# Patient Record
Sex: MALE | Race: BLACK OR AFRICAN AMERICAN | NOT HISPANIC OR LATINO | Employment: STUDENT | URBAN - METROPOLITAN AREA
[De-identification: names, ages, dates, MRNs, and addresses within clinical notes are randomized per-mention and may not be internally consistent; named-entity substitution may affect disease eponyms.]

---

## 2018-10-22 ENCOUNTER — PATIENT MESSAGE (OUTPATIENT)
Dept: ADMINISTRATIVE | Facility: HOSPITAL | Age: 28
End: 2018-10-22

## 2018-11-05 ENCOUNTER — PATIENT MESSAGE (OUTPATIENT)
Dept: DIABETES | Facility: CLINIC | Age: 28
End: 2018-11-05

## 2019-11-19 ENCOUNTER — OFFICE VISIT (OUTPATIENT)
Dept: PULMONOLOGY | Facility: CLINIC | Age: 29
End: 2019-11-19
Payer: COMMERCIAL

## 2019-11-19 ENCOUNTER — HOSPITAL ENCOUNTER (OUTPATIENT)
Dept: RADIOLOGY | Facility: HOSPITAL | Age: 29
Discharge: HOME | End: 2019-11-19
Attending: INTERNAL MEDICINE
Payer: COMMERCIAL

## 2019-11-19 ENCOUNTER — PATIENT MESSAGE (OUTPATIENT)
Dept: PULMONOLOGY | Facility: CLINIC | Age: 29
End: 2019-11-19

## 2019-11-19 VITALS
BODY MASS INDEX: 19.61 KG/M2 | HEIGHT: 73 IN | OXYGEN SATURATION: 97 % | SYSTOLIC BLOOD PRESSURE: 124 MMHG | HEART RATE: 95 BPM | RESPIRATION RATE: 18 BRPM | WEIGHT: 147.94 LBS | DIASTOLIC BLOOD PRESSURE: 72 MMHG

## 2019-11-19 DIAGNOSIS — J45.909 ASTHMA, NOT WELL CONTROLLED, UNSPECIFIED ASTHMA SEVERITY, UNSPECIFIED WHETHER COMPLICATED, UNSPECIFIED WHETHER PERSISTENT: ICD-10-CM

## 2019-11-19 DIAGNOSIS — Z82.5 FAMILY HISTORY OF ASTHMA: ICD-10-CM

## 2019-11-19 DIAGNOSIS — J45.909 ASTHMA, NOT WELL CONTROLLED, UNSPECIFIED ASTHMA SEVERITY, UNSPECIFIED WHETHER COMPLICATED, UNSPECIFIED WHETHER PERSISTENT: Primary | ICD-10-CM

## 2019-11-19 DIAGNOSIS — Z87.09 HISTORY OF PNEUMOTHORAX: ICD-10-CM

## 2019-11-19 DIAGNOSIS — Z87.09 HISTORY OF INFLUENZA: ICD-10-CM

## 2019-11-19 PROCEDURE — 3008F PR BODY MASS INDEX (BMI) DOCUMENTED: ICD-10-PCS | Mod: CPTII,S$GLB,, | Performed by: INTERNAL MEDICINE

## 2019-11-19 PROCEDURE — 71046 X-RAY EXAM CHEST 2 VIEWS: CPT | Mod: TC

## 2019-11-19 PROCEDURE — 99205 PR OFFICE/OUTPT VISIT, NEW, LEVL V, 60-74 MIN: ICD-10-PCS | Mod: S$GLB,,, | Performed by: INTERNAL MEDICINE

## 2019-11-19 PROCEDURE — 99999 PR PBB SHADOW E&M-EST. PATIENT-LVL III: ICD-10-PCS | Mod: PBBFAC,,, | Performed by: INTERNAL MEDICINE

## 2019-11-19 PROCEDURE — 3008F BODY MASS INDEX DOCD: CPT | Mod: CPTII,S$GLB,, | Performed by: INTERNAL MEDICINE

## 2019-11-19 PROCEDURE — 99205 OFFICE O/P NEW HI 60 MIN: CPT | Mod: S$GLB,,, | Performed by: INTERNAL MEDICINE

## 2019-11-19 PROCEDURE — 71046 X-RAY EXAM CHEST 2 VIEWS: CPT | Mod: 26,,, | Performed by: RADIOLOGY

## 2019-11-19 PROCEDURE — 99999 PR PBB SHADOW E&M-EST. PATIENT-LVL III: CPT | Mod: PBBFAC,,, | Performed by: INTERNAL MEDICINE

## 2019-11-19 PROCEDURE — 71046 XR CHEST PA AND LATERAL: ICD-10-PCS | Mod: 26,,, | Performed by: RADIOLOGY

## 2019-11-19 RX ORDER — ALBUTEROL SULFATE 0.83 MG/ML
SOLUTION RESPIRATORY (INHALATION)
COMMUNITY
Start: 2019-11-08 | End: 2022-12-27 | Stop reason: SDUPTHER

## 2019-11-19 RX ORDER — PREDNISONE 10 MG/1
TABLET ORAL
Qty: 21 TABLET | Refills: 0 | Status: SHIPPED | OUTPATIENT
Start: 2019-11-19 | End: 2020-02-27

## 2019-11-19 RX ORDER — ALBUTEROL SULFATE 90 UG/1
2 AEROSOL, METERED RESPIRATORY (INHALATION) EVERY 6 HOURS PRN
Qty: 18 G | Refills: 11 | Status: SHIPPED | OUTPATIENT
Start: 2019-11-19 | End: 2021-08-21

## 2019-11-19 RX ORDER — FLUTICASONE FUROATE AND VILANTEROL 100; 25 UG/1; UG/1
1 POWDER RESPIRATORY (INHALATION) DAILY
Qty: 60 EACH | Refills: 6 | Status: SHIPPED | OUTPATIENT
Start: 2019-11-19 | End: 2021-06-22 | Stop reason: SDUPTHER

## 2019-11-19 NOTE — LETTER
November 19, 2019      No Recipients           [facility]  [address]  [place] [address]  Phone: [phone]  Fax: [phone]          Patient: Quinn Cardozo Jr.   MR Number: [id number]   YOB: 1990   Date of Visit: 11/19/2019       Dear :    Thank you for referring Quinn Cardozo to me for evaluation. Attached you will find relevant portions of my assessment and plan of care.    If you have questions, please do not hesitate to call me. I look forward to following Quinn Cardozo along with you.    Sincerely,    Kim Dexter MD    Enclosure  CC:  No Recipients    If you would like to receive this communication electronically, please contact [email or web address] or [phone] to request more information on [employer] access.    For providers and/or their staff who would like to refer a patient to [facility], please contact us through our one-stop-shop provider referral line, Olga Aly, at [phone].    If you feel you have received this communication in error or would no longer like to receive these types of communications, please e-mail [email or web address]

## 2019-11-19 NOTE — PROGRESS NOTES
Initial Outpatient Pulmonary Evaluation       SUBJECTIVE:     Chief Complaint   Patient presents with    Asthma       History of Present Illness:    Patient is a 29 y.o. male presenting for establishing care of not well controlled asthma/exacerbation after a recent upper respiratory infection due to influenza virus.  He was evaluated at urgent care and was prescribed Tamiflu which he completed.    Complains of dry cough shortness of breaths chest tightness intermittent wheezing.    Has about 13 years history of asthma, at some point on Advair.    He did have 2 left-sided pneumothorax within 2 years time frame status post definitive surgical therapy.    Has been on of albuterol.  Stopped using any inhaler a year ago he felt well overall.    Does have family history of asthma.    He works as  at school.    Not a smoker.        Review of Systems   Constitutional: Negative for fever and chills.   HENT: Negative for nosebleeds.    Eyes: Negative for redness.   Respiratory: Positive for cough, sputum production, shortness of breath, wheezing, dyspnea on extertion and use of rescue inhaler. Negative for choking.    Genitourinary: Negative for hematuria.   Endocrine: Negative for cold intolerance.    Musculoskeletal: Negative for arthralgias.   Skin: Negative for rash.   Gastrointestinal: Negative for vomiting.   Neurological: Negative for syncope.   Hematological: Negative for adenopathy.   Psychiatric/Behavioral: Negative for confusion.       Review of patient's allergies indicates:  No Known Allergies    Current Outpatient Medications   Medication Sig Dispense Refill    albuterol (PROVENTIL) 2.5 mg /3 mL (0.083 %) nebulizer solution       albuterol (PROVENTIL/VENTOLIN HFA) 90 mcg/actuation inhaler Inhale 2 puffs into the lungs every 6 (six) hours as needed for Wheezing. Rescue 18 g 11    fluticasone (FLONASE) 50 mcg/actuation nasal spray 2 sprays by Each Nare  "route once daily. 16 g 3    fluticasone furoate-vilanterol (BREO ELLIPTA) 100-25 mcg/dose diskus inhaler Inhale 1 puff into the lungs once daily. Controller 60 each 6    predniSONE (DELTASONE) 10 MG tablet Prednisone 40 mg daily for 3 days then 20 mg daily for 3 days then 10 mg daily for 3 days 21 tablet 0     No current facility-administered medications for this visit.        Past Medical History:   Diagnosis Date    Allergy     Asthma     Pneumothorax     x 2     Past Surgical History:   Procedure Laterality Date    LUNG LOBECTOMY      PLEURAL SCARIFICATION      TONSILLECTOMY       Family History   Problem Relation Age of Onset    No Known Problems Mother     No Known Problems Father     Stroke Other     Breast cancer Maternal Aunt     Breast cancer Maternal Grandmother      Social History     Tobacco Use    Smoking status: Never Smoker    Smokeless tobacco: Never Used   Substance Use Topics    Alcohol use: Yes     Comment: Rarely    Drug use: No          OBJECTIVE:     Vital Signs (Most Recent)  Vital Signs  Pulse: 95  Resp: 18  SpO2: 97 %  BP: 124/72  Height and Weight  Height: 6' 1" (185.4 cm)  Weight: 67.1 kg (147 lb 14.9 oz)  BSA (Calculated - sq m): 1.86 sq meters  BMI (Calculated): 19.5  Weight in (lb) to have BMI = 25: 189.1]  Wt Readings from Last 2 Encounters:   11/19/19 67.1 kg (147 lb 14.9 oz)   08/02/16 63.1 kg (139 lb 1.8 oz)         Physical Exam:  Physical Exam   Constitutional: He is oriented to person, place, and time. He appears well-developed.   Slim long body habitus   HENT:   Head: Normocephalic.   Neck: Neck supple.   Cardiovascular: Normal rate, regular rhythm and normal heart sounds.   Pulmonary/Chest: Normal expansion and effort normal. No stridor. No respiratory distress. He exhibits no tenderness.   Abdominal: Soft.   Musculoskeletal: He exhibits no tenderness.   Lymphadenopathy:     He has no cervical adenopathy.   Neurological: He is alert and oriented to person, " place, and time. Gait normal.   Skin: Skin is warm. No cyanosis. Nails show no clubbing.   Psychiatric: He has a normal mood and affect. His behavior is normal. Judgment and thought content normal.   Nursing note and vitals reviewed.      Laboratory  No results found for: WBC, RBC, HGB, HCT, MCV, MCH, MCHC, RDW, PLT, MPV, GRAN, LYMPH, MONO, EOS, BASO, EOSINOPHIL, BASOPHIL    BMP  No results found for: NA, K, CL, CO2, BUN, CREATININE, CALCIUM, ANIONGAP, ESTGFRAFRICA, EGFRNONAA, AST, ALT, PROT    No results found for: BNP    No results found for: TSH    No results found for: SEDRATE    No results found for: CRP      Diagnostic Results:  I have personally reviewed today the following studies :    Chest x-ray 2016 mild hyperinflation.        ASSESSMENT/PLAN:     Asthma, not well controlled, unspecified asthma severity, unspecified whether complicated, unspecified whether persistent  -     X-Ray Chest PA And Lateral; Future; Expected date: 11/19/2019  -     CBC auto differential; Future; Expected date: 11/19/2019  -     IgE; Future; Expected date: 11/19/2019  -     fluticasone furoate-vilanterol (BREO ELLIPTA) 100-25 mcg/dose diskus inhaler; Inhale 1 puff into the lungs once daily. Controller  Dispense: 60 each; Refill: 6  -     albuterol (PROVENTIL/VENTOLIN HFA) 90 mcg/actuation inhaler; Inhale 2 puffs into the lungs every 6 (six) hours as needed for Wheezing. Rescue  Dispense: 18 g; Refill: 11    History of influenza  -     X-Ray Chest PA And Lateral; Future; Expected date: 11/19/2019    History of pneumothorax  -     X-Ray Chest PA And Lateral; Future; Expected date: 11/19/2019    Family history of asthma  -     Spirometry with/without bronchodilator; Future; Expected date: 12/19/2019    Other orders  -     predniSONE (DELTASONE) 10 MG tablet; Prednisone 40 mg daily for 3 days then 20 mg daily for 3 days then 10 mg daily for 3 days  Dispense: 21 tablet; Refill: 0    Albuterol p.r.n.    Start Breo 100 mcg 1 puff daily.       Taper of prednisone.    Spirometry in 1 month.    Check chest x-ray CBC and IgE today.    Patient is having likely worsening/exacerbation of asthma due to recent URI.    Further recommendation to follow above workup and patient clinical response.      Follow up in about 1 month (around 12/19/2019).    This note was prepared using voice recognition system and is likely to have sound alike errors that may have been overlooked even after proof reading.  Please call me with any questions    Discussed diagnosis, its evaluation, treatment and usual course. All questions answered.    Thank you for the courtesy of participating in the care of this patient    Kim Dexter MD

## 2019-11-25 ENCOUNTER — PATIENT MESSAGE (OUTPATIENT)
Dept: PULMONOLOGY | Facility: CLINIC | Age: 29
End: 2019-11-25

## 2020-02-21 ENCOUNTER — TELEPHONE (OUTPATIENT)
Dept: PULMONOLOGY | Facility: CLINIC | Age: 30
End: 2020-02-21

## 2020-02-27 ENCOUNTER — OFFICE VISIT (OUTPATIENT)
Dept: INTERNAL MEDICINE | Facility: CLINIC | Age: 30
End: 2020-02-27
Payer: COMMERCIAL

## 2020-02-27 VITALS
HEART RATE: 84 BPM | WEIGHT: 145.94 LBS | DIASTOLIC BLOOD PRESSURE: 72 MMHG | BODY MASS INDEX: 19.26 KG/M2 | OXYGEN SATURATION: 99 % | TEMPERATURE: 96 F | SYSTOLIC BLOOD PRESSURE: 112 MMHG

## 2020-02-27 DIAGNOSIS — Z00.00 ANNUAL PHYSICAL EXAM: Primary | ICD-10-CM

## 2020-02-27 PROCEDURE — 99395 PREV VISIT EST AGE 18-39: CPT | Mod: 25,S$GLB,, | Performed by: FAMILY MEDICINE

## 2020-02-27 PROCEDURE — 90686 IIV4 VACC NO PRSV 0.5 ML IM: CPT | Mod: S$GLB,,, | Performed by: FAMILY MEDICINE

## 2020-02-27 PROCEDURE — 90714 TD VACC NO PRESV 7 YRS+ IM: CPT | Mod: S$GLB,,, | Performed by: FAMILY MEDICINE

## 2020-02-27 PROCEDURE — 90472 IMMUNIZATION ADMIN EACH ADD: CPT | Mod: S$GLB,,, | Performed by: FAMILY MEDICINE

## 2020-02-27 PROCEDURE — 90471 FLU VACCINE (QUAD) GREATER THAN OR EQUAL TO 3YO PRESERVATIVE FREE IM: ICD-10-PCS | Mod: S$GLB,,, | Performed by: FAMILY MEDICINE

## 2020-02-27 PROCEDURE — 99999 PR PBB SHADOW E&M-EST. PATIENT-LVL III: CPT | Mod: PBBFAC,,, | Performed by: FAMILY MEDICINE

## 2020-02-27 PROCEDURE — 90714 TD VACCINE GREATER THAN OR EQUAL TO 7YO PRESERVATIVE FREE IM: ICD-10-PCS | Mod: S$GLB,,, | Performed by: FAMILY MEDICINE

## 2020-02-27 PROCEDURE — 99395 PR PREVENTIVE VISIT,EST,18-39: ICD-10-PCS | Mod: 25,S$GLB,, | Performed by: FAMILY MEDICINE

## 2020-02-27 PROCEDURE — 90471 IMMUNIZATION ADMIN: CPT | Mod: S$GLB,,, | Performed by: FAMILY MEDICINE

## 2020-02-27 PROCEDURE — 99999 PR PBB SHADOW E&M-EST. PATIENT-LVL III: ICD-10-PCS | Mod: PBBFAC,,, | Performed by: FAMILY MEDICINE

## 2020-02-27 PROCEDURE — 90686 FLU VACCINE (QUAD) GREATER THAN OR EQUAL TO 3YO PRESERVATIVE FREE IM: ICD-10-PCS | Mod: S$GLB,,, | Performed by: FAMILY MEDICINE

## 2020-02-27 PROCEDURE — 90472 TD VACCINE GREATER THAN OR EQUAL TO 7YO PRESERVATIVE FREE IM: ICD-10-PCS | Mod: S$GLB,,, | Performed by: FAMILY MEDICINE

## 2020-02-27 RX ORDER — MONTELUKAST SODIUM 10 MG/1
10 TABLET ORAL NIGHTLY
Qty: 90 TABLET | Refills: 1 | Status: SHIPPED | OUTPATIENT
Start: 2020-02-27 | End: 2020-03-28

## 2020-02-27 NOTE — PROGRESS NOTES
Subjective:       Patient ID: Quinn Cardozo Jr. is a 29 y.o. male.    Chief Complaint: No chief complaint on file.    HPI   29    Past Medical History:   Diagnosis Date    Allergy     Asthma     Pneumothorax     x 2     Past Surgical History:   Procedure Laterality Date    LUNG LOBECTOMY      PLEURAL SCARIFICATION      TONSILLECTOMY       Family History   Problem Relation Age of Onset    No Known Problems Mother     No Known Problems Father     Stroke Other     Breast cancer Maternal Aunt     Breast cancer Maternal Grandmother        Social History     Tobacco Use   Smoking Status Never Smoker   Smokeless Tobacco Never Used     Wt Readings from Last 5 Encounters:   02/27/20 66.2 kg (145 lb 15.1 oz)   11/19/19 67.1 kg (147 lb 14.9 oz)   08/02/16 63.1 kg (139 lb 1.8 oz)   07/18/16 63.8 kg (140 lb 10.5 oz)   03/26/15 63.7 kg (140 lb 7 oz)     execises  3x week    No active health concerns    Asthma controlled, but notes he did better w/ singulair in past and wants to return to such.        Review of Systems   Constitutional: Negative for activity change and unexpected weight change.   HENT: Negative for hearing loss, rhinorrhea and trouble swallowing.    Eyes: Negative for discharge and visual disturbance.   Respiratory: Negative for chest tightness and wheezing.    Cardiovascular: Negative for chest pain and palpitations.   Gastrointestinal: Negative for blood in stool, constipation, diarrhea and vomiting.   Endocrine: Negative for polydipsia and polyuria.   Genitourinary: Negative for difficulty urinating, hematuria and urgency.   Musculoskeletal: Positive for arthralgias. Negative for joint swelling and neck pain.   Neurological: Negative for weakness and headaches.   Psychiatric/Behavioral: Positive for dysphoric mood. Negative for confusion.       Objective:       Vitals:    02/27/20 1621   BP: 112/72   Pulse: 84   Temp: 96 °F (35.6 °C)       Physical Exam   Constitutional: He is oriented to  person, place, and time. He appears well-developed and well-nourished. No distress.   HENT:   Head: Normocephalic and atraumatic.   Eyes: Conjunctivae are normal. Right eye exhibits no discharge. Left eye exhibits no discharge.   Neck: Trachea normal and full passive range of motion without pain.   Cardiovascular: Normal rate, regular rhythm and normal heart sounds.   Pulmonary/Chest: Effort normal and breath sounds normal. No respiratory distress. He has no decreased breath sounds. He has no wheezes.   Abdominal: Soft. Normal appearance. There is no tenderness.   Musculoskeletal: He exhibits no edema or deformity.   Lymphadenopathy:     He has no cervical adenopathy.   Neurological: He is alert and oriented to person, place, and time.   Skin: Skin is intact. No pallor.   Psychiatric: He has a normal mood and affect. His speech is normal and behavior is normal. Thought content normal.       Assessment:       1. Annual physical exam        Plan:   Annual physical exam  -     CBC auto differential; Future; Expected date: 02/27/2020  -     Comprehensive metabolic panel; Future; Expected date: 02/27/2020  -     Lipid panel; Future; Expected date: 02/27/2020  -     TSH; Future; Expected date: 02/27/2020    Other orders  -     (In Office Administered) Td Vaccine - Preservative Free  -     montelukast (SINGULAIR) 10 mg tablet; Take 1 tablet (10 mg total) by mouth every evening.  Dispense: 90 tablet; Refill: 1      Asthma/allergy  Did better w/ singulair  Will add back

## 2020-02-28 ENCOUNTER — LAB VISIT (OUTPATIENT)
Dept: LAB | Facility: HOSPITAL | Age: 30
End: 2020-02-28
Attending: FAMILY MEDICINE
Payer: COMMERCIAL

## 2020-02-28 DIAGNOSIS — Z00.00 ANNUAL PHYSICAL EXAM: ICD-10-CM

## 2020-02-28 LAB
ALBUMIN SERPL BCP-MCNC: 4.2 G/DL (ref 3.5–5.2)
ALP SERPL-CCNC: 73 U/L (ref 55–135)
ALT SERPL W/O P-5'-P-CCNC: 17 U/L (ref 10–44)
ANION GAP SERPL CALC-SCNC: 8 MMOL/L (ref 8–16)
AST SERPL-CCNC: 15 U/L (ref 10–40)
BASOPHILS # BLD AUTO: 0.03 K/UL (ref 0–0.2)
BASOPHILS NFR BLD: 0.5 % (ref 0–1.9)
BILIRUB SERPL-MCNC: 0.7 MG/DL (ref 0.1–1)
BUN SERPL-MCNC: 8 MG/DL (ref 6–20)
CALCIUM SERPL-MCNC: 10.3 MG/DL (ref 8.7–10.5)
CHLORIDE SERPL-SCNC: 104 MMOL/L (ref 95–110)
CHOLEST SERPL-MCNC: 160 MG/DL (ref 120–199)
CHOLEST/HDLC SERPL: 3.4 {RATIO} (ref 2–5)
CO2 SERPL-SCNC: 28 MMOL/L (ref 23–29)
CREAT SERPL-MCNC: 1.2 MG/DL (ref 0.5–1.4)
DIFFERENTIAL METHOD: ABNORMAL
EOSINOPHIL # BLD AUTO: 0 K/UL (ref 0–0.5)
EOSINOPHIL NFR BLD: 0.5 % (ref 0–8)
ERYTHROCYTE [DISTWIDTH] IN BLOOD BY AUTOMATED COUNT: 11.6 % (ref 11.5–14.5)
EST. GFR  (AFRICAN AMERICAN): >60 ML/MIN/1.73 M^2
EST. GFR  (NON AFRICAN AMERICAN): >60 ML/MIN/1.73 M^2
GLUCOSE SERPL-MCNC: 91 MG/DL (ref 70–110)
HCT VFR BLD AUTO: 45.5 % (ref 40–54)
HDLC SERPL-MCNC: 47 MG/DL (ref 40–75)
HDLC SERPL: 29.4 % (ref 20–50)
HGB BLD-MCNC: 14.8 G/DL (ref 14–18)
IMM GRANULOCYTES # BLD AUTO: 0.01 K/UL (ref 0–0.04)
IMM GRANULOCYTES NFR BLD AUTO: 0.2 % (ref 0–0.5)
LDLC SERPL CALC-MCNC: 94.8 MG/DL (ref 63–159)
LYMPHOCYTES # BLD AUTO: 1.5 K/UL (ref 1–4.8)
LYMPHOCYTES NFR BLD: 22.8 % (ref 18–48)
MCH RBC QN AUTO: 31.7 PG (ref 27–31)
MCHC RBC AUTO-ENTMCNC: 32.5 G/DL (ref 32–36)
MCV RBC AUTO: 97 FL (ref 82–98)
MONOCYTES # BLD AUTO: 0.5 K/UL (ref 0.3–1)
MONOCYTES NFR BLD: 8.3 % (ref 4–15)
NEUTROPHILS # BLD AUTO: 4.4 K/UL (ref 1.8–7.7)
NEUTROPHILS NFR BLD: 67.7 % (ref 38–73)
NONHDLC SERPL-MCNC: 113 MG/DL
NRBC BLD-RTO: 0 /100 WBC
PLATELET # BLD AUTO: 332 K/UL (ref 150–350)
PMV BLD AUTO: 10 FL (ref 9.2–12.9)
POTASSIUM SERPL-SCNC: 4.6 MMOL/L (ref 3.5–5.1)
PROT SERPL-MCNC: 7.4 G/DL (ref 6–8.4)
RBC # BLD AUTO: 4.67 M/UL (ref 4.6–6.2)
SODIUM SERPL-SCNC: 140 MMOL/L (ref 136–145)
TRIGL SERPL-MCNC: 91 MG/DL (ref 30–150)
TSH SERPL DL<=0.005 MIU/L-ACNC: 1.28 UIU/ML (ref 0.4–4)
WBC # BLD AUTO: 6.5 K/UL (ref 3.9–12.7)

## 2020-02-28 PROCEDURE — 84443 ASSAY THYROID STIM HORMONE: CPT

## 2020-02-28 PROCEDURE — 80061 LIPID PANEL: CPT

## 2020-02-28 PROCEDURE — 85025 COMPLETE CBC W/AUTO DIFF WBC: CPT

## 2020-02-28 PROCEDURE — 36415 COLL VENOUS BLD VENIPUNCTURE: CPT

## 2020-02-28 PROCEDURE — 80053 COMPREHEN METABOLIC PANEL: CPT

## 2020-03-04 ENCOUNTER — TELEPHONE (OUTPATIENT)
Dept: INTERNAL MEDICINE | Facility: CLINIC | Age: 30
End: 2020-03-04

## 2020-03-04 NOTE — TELEPHONE ENCOUNTER
----- Message from Ryley Rain MD sent at 3/4/2020 12:24 PM CST -----  Please call the patient regarding his lab  No active or concerning findings on his lab work.

## 2020-03-05 ENCOUNTER — PATIENT MESSAGE (OUTPATIENT)
Dept: DERMATOLOGY | Facility: CLINIC | Age: 30
End: 2020-03-05

## 2020-05-01 ENCOUNTER — TELEPHONE (OUTPATIENT)
Dept: DERMATOLOGY | Facility: CLINIC | Age: 30
End: 2020-05-01

## 2020-05-18 ENCOUNTER — PATIENT MESSAGE (OUTPATIENT)
Dept: INTERNAL MEDICINE | Facility: CLINIC | Age: 30
End: 2020-05-18

## 2020-05-18 ENCOUNTER — PATIENT MESSAGE (OUTPATIENT)
Dept: PULMONOLOGY | Facility: CLINIC | Age: 30
End: 2020-05-18

## 2020-07-31 ENCOUNTER — LAB VISIT (OUTPATIENT)
Dept: PRIMARY CARE CLINIC | Facility: CLINIC | Age: 30
End: 2020-07-31
Payer: COMMERCIAL

## 2020-07-31 DIAGNOSIS — Z01.84 ENCOUNTER FOR ANTIBODY RESPONSE EXAMINATION: ICD-10-CM

## 2020-07-31 DIAGNOSIS — Z00.6 RESEARCH EXAM: ICD-10-CM

## 2020-07-31 LAB — SARS-COV-2 IGG SERPLBLD QL IA.RAPID: NEGATIVE

## 2020-07-31 PROCEDURE — U0003 INFECTIOUS AGENT DETECTION BY NUCLEIC ACID (DNA OR RNA); SEVERE ACUTE RESPIRATORY SYNDROME CORONAVIRUS 2 (SARS-COV-2) (CORONAVIRUS DISEASE [COVID-19]), AMPLIFIED PROBE TECHNIQUE, MAKING USE OF HIGH THROUGHPUT TECHNOLOGIES AS DESCRIBED BY CMS-2020-01-R: HCPCS

## 2020-07-31 PROCEDURE — 86769 SARS-COV-2 COVID-19 ANTIBODY: CPT

## 2020-07-31 NOTE — RESEARCH
Date of Consent: 7/31/2020    Sponsor: [facility]    Study Title/IRB Number: Observational study of Sars-CoV2 Immunoglobulin G (IgG) seroprevalence among the [facility] population over time 2020.163  Principle Investigator: Vicky Gregory, PhD    Did the patient need translation services? No   name: N/A    Prior to the Informed Consent (IC) being signed, or any study protocol required data collection, testing, procedure, or intervention being performed, the following was done and/or discussed:   Patient was given a paper copy of the IC for review    Patient was given study FAQ   Purpose of the study and qualifications to participate    Study design and tests or procedures done at this visit   Confidentiality and HIPAA Authorization for Release of Medical Records for the research trial/ subject's rights/research related injury   Risk, Benefits, Alternative Treatments, Compensation and Costs   Participation in the research trial is voluntary and patient may withdraw at anytime   Contact information for study related questions    Patient verbalizes understanding of the above: Yes  Contact information for PI and IRB given to patient: Yes  Patient able to adequately summarize: the purpose of the study, the risks associated with the study, and all procedures, testing, and follow-ups associated with the study: Yes    The consent was discussed verbally with the patient and all questions were answered satisfactorily. Patient gave verbal consent for the Seroprevalence research study with an IRB approval date of 06/23/2020.      The Consent, Consent Witness and name of Clinical Research Coordinator consenting was captured and documented in [employer].    All Inclusion and Exclusion Criteria reviewed, subject meets all Inclusion criteria and does not meet any Exclusion Criteria at this time.     Patient Eligibility was confirmed.    Patient responded to survey questions.    The following biospecimen  collection procedures were collected:    -Nasopharyngeal Swab Collection  -Blood collection

## 2020-08-02 LAB — SARS-COV-2 RNA RESP QL NAA+PROBE: NOT DETECTED

## 2020-11-25 ENCOUNTER — PATIENT MESSAGE (OUTPATIENT)
Dept: INTERNAL MEDICINE | Facility: CLINIC | Age: 30
End: 2020-11-25

## 2020-11-25 ENCOUNTER — PATIENT MESSAGE (OUTPATIENT)
Dept: PULMONOLOGY | Facility: CLINIC | Age: 30
End: 2020-11-25

## 2020-11-26 DIAGNOSIS — J45.909 ASTHMA, NOT WELL CONTROLLED, UNSPECIFIED ASTHMA SEVERITY, UNSPECIFIED WHETHER COMPLICATED, UNSPECIFIED WHETHER PERSISTENT: Primary | ICD-10-CM

## 2020-12-03 ENCOUNTER — PATIENT MESSAGE (OUTPATIENT)
Dept: PULMONOLOGY | Facility: CLINIC | Age: 30
End: 2020-12-03

## 2020-12-04 ENCOUNTER — LAB VISIT (OUTPATIENT)
Dept: OTOLARYNGOLOGY | Facility: CLINIC | Age: 30
End: 2020-12-04
Payer: COMMERCIAL

## 2020-12-04 DIAGNOSIS — J45.909 ASTHMA, NOT WELL CONTROLLED, UNSPECIFIED ASTHMA SEVERITY, UNSPECIFIED WHETHER COMPLICATED, UNSPECIFIED WHETHER PERSISTENT: ICD-10-CM

## 2020-12-04 PROCEDURE — U0003 INFECTIOUS AGENT DETECTION BY NUCLEIC ACID (DNA OR RNA); SEVERE ACUTE RESPIRATORY SYNDROME CORONAVIRUS 2 (SARS-COV-2) (CORONAVIRUS DISEASE [COVID-19]), AMPLIFIED PROBE TECHNIQUE, MAKING USE OF HIGH THROUGHPUT TECHNOLOGIES AS DESCRIBED BY CMS-2020-01-R: HCPCS

## 2020-12-05 LAB — SARS-COV-2 RNA RESP QL NAA+PROBE: NOT DETECTED

## 2020-12-07 ENCOUNTER — CLINICAL SUPPORT (OUTPATIENT)
Dept: PULMONOLOGY | Facility: CLINIC | Age: 30
End: 2020-12-07
Payer: COMMERCIAL

## 2020-12-07 DIAGNOSIS — J45.909 ASTHMA, NOT WELL CONTROLLED, UNSPECIFIED ASTHMA SEVERITY, UNSPECIFIED WHETHER COMPLICATED, UNSPECIFIED WHETHER PERSISTENT: Primary | ICD-10-CM

## 2020-12-07 LAB
BRPFT: ABNORMAL
FEF 25 75 LLN: 2.44
FEF 25 75 PRE REF: 73.4 %
FEF 25 75 REF: 4.27
FEV1 FVC LLN: 73
FEV1 FVC PRE REF: 87 %
FEV1 FVC REF: 84
FEV1 LLN: 3.21
FEV1 PRE REF: 99.5 %
FEV1 REF: 4.13
FVC LLN: 3.93
FVC PRE REF: 113.8 %
FVC REF: 4.97
PEF LLN: 7.42
PEF PRE REF: 83.2 %
PEF REF: 10.24
PRE FEF 25 75: 3.13 L/S (ref 2.44–6.1)
PRE FET 100: 6.22 SEC
PRE FEV1 FVC: 72.68 % (ref 72.82–94.27)
PRE FEV1: 4.11 L (ref 3.21–5.05)
PRE FVC: 5.66 L (ref 3.93–6.01)
PRE PEF: 8.52 L/S (ref 7.42–13.05)

## 2020-12-07 PROCEDURE — 94010 BREATHING CAPACITY TEST: ICD-10-PCS | Mod: S$GLB,,, | Performed by: INTERNAL MEDICINE

## 2020-12-07 PROCEDURE — 94010 BREATHING CAPACITY TEST: CPT | Mod: S$GLB,,, | Performed by: INTERNAL MEDICINE

## 2020-12-08 ENCOUNTER — LAB VISIT (OUTPATIENT)
Dept: LAB | Facility: HOSPITAL | Age: 30
End: 2020-12-08
Attending: INTERNAL MEDICINE
Payer: COMMERCIAL

## 2020-12-08 ENCOUNTER — OFFICE VISIT (OUTPATIENT)
Dept: PULMONOLOGY | Facility: CLINIC | Age: 30
End: 2020-12-08
Payer: COMMERCIAL

## 2020-12-08 VITALS
RESPIRATION RATE: 17 BRPM | SYSTOLIC BLOOD PRESSURE: 130 MMHG | BODY MASS INDEX: 19.9 KG/M2 | WEIGHT: 150.13 LBS | TEMPERATURE: 97 F | OXYGEN SATURATION: 99 % | DIASTOLIC BLOOD PRESSURE: 84 MMHG | HEIGHT: 73 IN | HEART RATE: 95 BPM

## 2020-12-08 DIAGNOSIS — R09.82 POSTNASAL DRIP: ICD-10-CM

## 2020-12-08 DIAGNOSIS — J45.30 WELL CONTROLLED MILD PERSISTENT ASTHMA: ICD-10-CM

## 2020-12-08 DIAGNOSIS — Z23 NEED FOR INFLUENZA VACCINATION: ICD-10-CM

## 2020-12-08 DIAGNOSIS — Z23 NEED FOR 23-POLYVALENT PNEUMOCOCCAL POLYSACCHARIDE VACCINE: Primary | ICD-10-CM

## 2020-12-08 PROCEDURE — 90732 PPSV23 VACC 2 YRS+ SUBQ/IM: CPT | Mod: S$GLB,,, | Performed by: INTERNAL MEDICINE

## 2020-12-08 PROCEDURE — 99999 PR PBB SHADOW E&M-EST. PATIENT-LVL III: CPT | Mod: PBBFAC,,, | Performed by: INTERNAL MEDICINE

## 2020-12-08 PROCEDURE — 90732 PNEUMOCOCCAL POLYSACCHARIDE VACCINE 23-VALENT =>2YO SQ IM: ICD-10-PCS | Mod: S$GLB,,, | Performed by: INTERNAL MEDICINE

## 2020-12-08 PROCEDURE — 36415 COLL VENOUS BLD VENIPUNCTURE: CPT

## 2020-12-08 PROCEDURE — 99214 PR OFFICE/OUTPT VISIT, EST, LEVL IV, 30-39 MIN: ICD-10-PCS | Mod: 25,S$GLB,, | Performed by: INTERNAL MEDICINE

## 2020-12-08 PROCEDURE — 90471 FLU VACCINE (QUAD) GREATER THAN OR EQUAL TO 3YO PRESERVATIVE FREE IM: ICD-10-PCS | Mod: S$GLB,,, | Performed by: INTERNAL MEDICINE

## 2020-12-08 PROCEDURE — 3008F PR BODY MASS INDEX (BMI) DOCUMENTED: ICD-10-PCS | Mod: CPTII,S$GLB,, | Performed by: INTERNAL MEDICINE

## 2020-12-08 PROCEDURE — 99214 OFFICE O/P EST MOD 30 MIN: CPT | Mod: 25,S$GLB,, | Performed by: INTERNAL MEDICINE

## 2020-12-08 PROCEDURE — 90472 PNEUMOCOCCAL POLYSACCHARIDE VACCINE 23-VALENT =>2YO SQ IM: ICD-10-PCS | Mod: S$GLB,,, | Performed by: INTERNAL MEDICINE

## 2020-12-08 PROCEDURE — 90472 IMMUNIZATION ADMIN EACH ADD: CPT | Mod: S$GLB,,, | Performed by: INTERNAL MEDICINE

## 2020-12-08 PROCEDURE — 3008F BODY MASS INDEX DOCD: CPT | Mod: CPTII,S$GLB,, | Performed by: INTERNAL MEDICINE

## 2020-12-08 PROCEDURE — 90686 IIV4 VACC NO PRSV 0.5 ML IM: CPT | Mod: S$GLB,,, | Performed by: INTERNAL MEDICINE

## 2020-12-08 PROCEDURE — 99999 PR PBB SHADOW E&M-EST. PATIENT-LVL III: ICD-10-PCS | Mod: PBBFAC,,, | Performed by: INTERNAL MEDICINE

## 2020-12-08 PROCEDURE — 90686 FLU VACCINE (QUAD) GREATER THAN OR EQUAL TO 3YO PRESERVATIVE FREE IM: ICD-10-PCS | Mod: S$GLB,,, | Performed by: INTERNAL MEDICINE

## 2020-12-08 PROCEDURE — 90471 IMMUNIZATION ADMIN: CPT | Mod: S$GLB,,, | Performed by: INTERNAL MEDICINE

## 2020-12-08 PROCEDURE — 82785 ASSAY OF IGE: CPT

## 2020-12-08 RX ORDER — MONTELUKAST SODIUM 10 MG/1
TABLET ORAL
COMMUNITY
Start: 2020-10-02 | End: 2021-12-27 | Stop reason: SDUPTHER

## 2020-12-08 NOTE — PROGRESS NOTES
Pulmonary Outpatient Follow Up Visit     Subjective:       Patient ID: Quinn Cardozo Jr. is a 30 y.o. male.    Chief Complaint: Asthma      HPI          30-year-old male patient presenting for 1 year follow-up.    Initially evaluated November 2021 for  not well controlled asthma/exacerbation after a recent upper respiratory infection due to influenza virus.  He was evaluated at urgent care and was prescribed Tamiflu .     A committed him to Breo 100 mcg 1 puff daily, continues to be on Singulair 1 tablet at bedtime albuterol p.r.n..    Doing well over the last year, asthma control test questionnaire score today 21.  Three weeks ago felt some chest tightness.  Spirometry within normal.     Has about 13 years history of asthma, at some point on Advair.     He did have 2 left-sided pneumothorax within 2 years time frame status post definitive surgical therapy.     Has been on of albuterol.  Stopped using any inhaler a year ago he felt well overall.     Does have family history of asthma.     He works as  at school.     Not a smoker.    Review of Systems   Constitutional: Negative for fever and chills.   HENT: Positive for postnasal drip. Negative for nosebleeds.    Eyes: Negative for redness.   Respiratory: Positive for cough, shortness of breath, dyspnea on extertion and use of rescue inhaler. Negative for sputum production, choking and wheezing.    Genitourinary: Negative for hematuria.   Endocrine: Negative for cold intolerance.    Musculoskeletal: Negative for arthralgias.   Skin: Negative for rash.   Gastrointestinal: Negative for vomiting.   Neurological: Negative for syncope.   Hematological: Negative for adenopathy.   Psychiatric/Behavioral: Negative for confusion and sleep disturbance. The patient is not nervous/anxious.        Outpatient Encounter Medications as of 12/8/2020   Medication Sig Dispense Refill    albuterol (PROVENTIL) 2.5 mg /3 mL  "(0.083 %) nebulizer solution       fluticasone (FLONASE) 50 mcg/actuation nasal spray 2 sprays by Each Nare route once daily. 16 g 3    fluticasone furoate-vilanterol (BREO ELLIPTA) 100-25 mcg/dose diskus inhaler Inhale 1 puff into the lungs once daily. Controller 60 each 6    montelukast (SINGULAIR) 10 mg tablet        No facility-administered encounter medications on file as of 12/8/2020.        Objective:     Vital Signs (Most Recent)  Vital Signs  Temp: 97 °F (36.1 °C)  Pulse: 95  Resp: 17  SpO2: 99 %  BP: 130/84  Height and Weight  Height: 6' 1" (185.4 cm)  Weight: 68.1 kg (150 lb 2.1 oz)  BSA (Calculated - sq m): 1.87 sq meters  BMI (Calculated): 19.8  Weight in (lb) to have BMI = 25: 189.1]  Wt Readings from Last 2 Encounters:   12/08/20 68.1 kg (150 lb 2.1 oz)   02/27/20 66.2 kg (145 lb 15.1 oz)       Physical Exam   Constitutional: He is oriented to person, place, and time. He appears well-developed.   Slim long body habitus   HENT:   Head: Normocephalic.   Neck: Neck supple.   Cardiovascular: Normal rate, regular rhythm and normal heart sounds.   Pulmonary/Chest: Normal expansion and effort normal. No stridor. No respiratory distress. He exhibits no tenderness.   Abdominal: Soft.   Musculoskeletal:         General: No tenderness.   Lymphadenopathy:     He has no cervical adenopathy.   Neurological: He is alert and oriented to person, place, and time. Gait normal.   Skin: Skin is warm. No cyanosis. Nails show no clubbing.   Psychiatric: He has a normal mood and affect. His behavior is normal. Judgment and thought content normal.   Nursing note and vitals reviewed.      Laboratory  Lab Results   Component Value Date    WBC 6.50 02/28/2020    RBC 4.67 02/28/2020    HGB 14.8 02/28/2020    HCT 45.5 02/28/2020    MCV 97 02/28/2020    MCH 31.7 (H) 02/28/2020    MCHC 32.5 02/28/2020    RDW 11.6 02/28/2020     02/28/2020    MPV 10.0 02/28/2020    GRAN 4.4 02/28/2020    GRAN 67.7 02/28/2020    LYMPH 1.5 " 02/28/2020    LYMPH 22.8 02/28/2020    MONO 0.5 02/28/2020    MONO 8.3 02/28/2020    EOS 0.0 02/28/2020    BASO 0.03 02/28/2020    EOSINOPHIL 0.5 02/28/2020    BASOPHIL 0.5 02/28/2020       BMP  Lab Results   Component Value Date     02/28/2020    K 4.6 02/28/2020     02/28/2020    CO2 28 02/28/2020    BUN 8 02/28/2020    CREATININE 1.2 02/28/2020    CALCIUM 10.3 02/28/2020    ANIONGAP 8 02/28/2020    ESTGFRAFRICA >60.0 02/28/2020    EGFRNONAA >60.0 02/28/2020    AST 15 02/28/2020    ALT 17 02/28/2020    PROT 7.4 02/28/2020       No results found for: BNP    Lab Results   Component Value Date    TSH 1.285 02/28/2020       No results found for: SEDRATE    No results found for: CRP  No results found for: IGE     No results found for: ASPERGILLUS  No results found for: AFUMIGATUSCL     No results found for: ACE     Diagnostic Results:    I have personally reviewed today the following studies:    Chest x-ray 2016 mild hyperinflation.    Chest x-ray November 2019    Heart and pulmonary vasculature stable.  Lungs mildly hyperinflated without consolidation or effusion.  CP angles clear.  Trachea midline.  Right cervical rib incidentally noted.  Osseous structures otherwise intact.    Spirometry 12/07/2020    Grade A-D -acceptable quality of tracingsNormal spirometry. (FEV1/VC greater than or equal to LLN and FVC greater than or equal to LLN)Flow volume loops are normal.Overall there is no significant ventilatory impairment. (FEV1 >LLN)      Assessment/Plan:   Need for 23-polyvalent pneumococcal polysaccharide vaccine  -     Pneumococcal Polysaccharide Vaccine (23 Valent) (SQ/IM)    Need for influenza vaccination  -     Pneumococcal Polysaccharide Vaccine (23 Valent) (SQ/IM)  -     Influenza - Quadrivalent (PF)    Well controlled mild persistent asthma  -     IgE; Future; Expected date: 12/08/2020    Postnasal drip  -     IgE; Future; Expected date: 12/08/2020        Continue albuterol p.r.n.    Continue Breo  100 mcg 1 puff daily.    Continue Singulair 1 tablet at bedtime.    Check IgE level.    Influenza vaccination and PPSV 23 today.      Follow up in about 6 months (around 6/8/2021).    This note was prepared using voice recognition system and is likely to have sound alike errors that may have been overlooked even after proof reading.  Please call me with any questions    Discussed diagnosis, its evaluation, treatment and usual course. All questions answered.      Kim Dexter MD

## 2020-12-09 LAB — IGE SERPL-ACNC: <35 IU/ML (ref 0–100)

## 2021-01-06 ENCOUNTER — OFFICE VISIT (OUTPATIENT)
Dept: GASTROENTEROLOGY | Facility: CLINIC | Age: 31
End: 2021-01-06
Payer: COMMERCIAL

## 2021-01-06 VITALS — HEIGHT: 73 IN | BODY MASS INDEX: 19.88 KG/M2 | WEIGHT: 150 LBS

## 2021-01-06 DIAGNOSIS — K21.9 GASTROESOPHAGEAL REFLUX DISEASE, UNSPECIFIED WHETHER ESOPHAGITIS PRESENT: Primary | ICD-10-CM

## 2021-01-06 DIAGNOSIS — R07.89 ATYPICAL CHEST PAIN: ICD-10-CM

## 2021-01-06 DIAGNOSIS — R14.2 BELCHING: ICD-10-CM

## 2021-01-06 PROCEDURE — 1126F AMNT PAIN NOTED NONE PRSNT: CPT | Mod: ,,, | Performed by: INTERNAL MEDICINE

## 2021-01-06 PROCEDURE — 3008F BODY MASS INDEX DOCD: CPT | Mod: CPTII,,, | Performed by: INTERNAL MEDICINE

## 2021-01-06 PROCEDURE — 99203 PR OFFICE/OUTPT VISIT, NEW, LEVL III, 30-44 MIN: ICD-10-PCS | Mod: 95,,, | Performed by: INTERNAL MEDICINE

## 2021-01-06 PROCEDURE — 1126F PR PAIN SEVERITY QUANTIFIED, NO PAIN PRESENT: ICD-10-PCS | Mod: ,,, | Performed by: INTERNAL MEDICINE

## 2021-01-06 PROCEDURE — 3008F PR BODY MASS INDEX (BMI) DOCUMENTED: ICD-10-PCS | Mod: CPTII,,, | Performed by: INTERNAL MEDICINE

## 2021-01-06 PROCEDURE — 99203 OFFICE O/P NEW LOW 30 MIN: CPT | Mod: 95,,, | Performed by: INTERNAL MEDICINE

## 2021-01-06 RX ORDER — OMEPRAZOLE 40 MG/1
40 CAPSULE, DELAYED RELEASE ORAL DAILY
Qty: 30 CAPSULE | Refills: 2 | Status: SHIPPED | OUTPATIENT
Start: 2021-01-06

## 2021-01-21 ENCOUNTER — OFFICE VISIT (OUTPATIENT)
Dept: DERMATOLOGY | Facility: CLINIC | Age: 31
End: 2021-01-21
Payer: COMMERCIAL

## 2021-01-21 DIAGNOSIS — L21.9 SEBORRHEIC DERMATITIS: Primary | ICD-10-CM

## 2021-01-21 DIAGNOSIS — L70.0 ACNE VULGARIS: ICD-10-CM

## 2021-01-21 DIAGNOSIS — L81.9 DYSCHROMIA: ICD-10-CM

## 2021-01-21 PROCEDURE — 1126F PR PAIN SEVERITY QUANTIFIED, NO PAIN PRESENT: ICD-10-PCS | Mod: S$GLB,,, | Performed by: PHYSICIAN ASSISTANT

## 2021-01-21 PROCEDURE — 99999 PR PBB SHADOW E&M-EST. PATIENT-LVL III: CPT | Mod: PBBFAC,,, | Performed by: PHYSICIAN ASSISTANT

## 2021-01-21 PROCEDURE — 1126F AMNT PAIN NOTED NONE PRSNT: CPT | Mod: S$GLB,,, | Performed by: PHYSICIAN ASSISTANT

## 2021-01-21 PROCEDURE — 99999 PR PBB SHADOW E&M-EST. PATIENT-LVL III: ICD-10-PCS | Mod: PBBFAC,,, | Performed by: PHYSICIAN ASSISTANT

## 2021-01-21 PROCEDURE — 99203 PR OFFICE/OUTPT VISIT, NEW, LEVL III, 30-44 MIN: ICD-10-PCS | Mod: S$GLB,,, | Performed by: PHYSICIAN ASSISTANT

## 2021-01-21 PROCEDURE — 99203 OFFICE O/P NEW LOW 30 MIN: CPT | Mod: S$GLB,,, | Performed by: PHYSICIAN ASSISTANT

## 2021-01-21 RX ORDER — KETOCONAZOLE 20 MG/G
CREAM TOPICAL 2 TIMES DAILY
Qty: 30 G | Refills: 0 | Status: SHIPPED | OUTPATIENT
Start: 2021-01-21 | End: 2021-03-18 | Stop reason: SDUPTHER

## 2021-01-21 RX ORDER — HYDROQUINONE 40 MG/G
CREAM TOPICAL
Qty: 30 G | Refills: 0 | Status: SHIPPED | OUTPATIENT
Start: 2021-01-21 | End: 2021-03-18 | Stop reason: SDUPTHER

## 2021-01-21 RX ORDER — BENZOYL PEROXIDE 100 MG/ML
LIQUID TOPICAL DAILY
Qty: 227 G | Refills: 5 | Status: SHIPPED | OUTPATIENT
Start: 2021-01-21 | End: 2021-03-18 | Stop reason: SDUPTHER

## 2021-03-25 ENCOUNTER — PATIENT MESSAGE (OUTPATIENT)
Dept: ADMINISTRATIVE | Facility: HOSPITAL | Age: 31
End: 2021-03-25

## 2021-04-28 ENCOUNTER — PATIENT MESSAGE (OUTPATIENT)
Dept: RESEARCH | Facility: HOSPITAL | Age: 31
End: 2021-04-28

## 2021-05-27 ENCOUNTER — TELEPHONE (OUTPATIENT)
Dept: PULMONOLOGY | Facility: CLINIC | Age: 31
End: 2021-05-27

## 2021-05-28 ENCOUNTER — TELEPHONE (OUTPATIENT)
Dept: PULMONOLOGY | Facility: CLINIC | Age: 31
End: 2021-05-28

## 2021-06-22 ENCOUNTER — OFFICE VISIT (OUTPATIENT)
Dept: PULMONOLOGY | Facility: CLINIC | Age: 31
End: 2021-06-22
Payer: COMMERCIAL

## 2021-06-22 VITALS
HEART RATE: 72 BPM | WEIGHT: 148.13 LBS | RESPIRATION RATE: 16 BRPM | OXYGEN SATURATION: 98 % | SYSTOLIC BLOOD PRESSURE: 110 MMHG | BODY MASS INDEX: 19.63 KG/M2 | DIASTOLIC BLOOD PRESSURE: 72 MMHG | HEIGHT: 73 IN

## 2021-06-22 DIAGNOSIS — J45.909 ASTHMA, NOT WELL CONTROLLED, UNSPECIFIED ASTHMA SEVERITY, UNSPECIFIED WHETHER COMPLICATED, UNSPECIFIED WHETHER PERSISTENT: ICD-10-CM

## 2021-06-22 DIAGNOSIS — J30.89 NON-SEASONAL ALLERGIC RHINITIS, UNSPECIFIED TRIGGER: ICD-10-CM

## 2021-06-22 DIAGNOSIS — J45.20 MILD INTERMITTENT ASTHMA WITHOUT COMPLICATION: Primary | ICD-10-CM

## 2021-06-22 PROCEDURE — 99214 OFFICE O/P EST MOD 30 MIN: CPT | Mod: S$GLB,,, | Performed by: NURSE PRACTITIONER

## 2021-06-22 PROCEDURE — 99214 PR OFFICE/OUTPT VISIT, EST, LEVL IV, 30-39 MIN: ICD-10-PCS | Mod: S$GLB,,, | Performed by: NURSE PRACTITIONER

## 2021-06-22 PROCEDURE — 99999 PR PBB SHADOW E&M-EST. PATIENT-LVL IV: ICD-10-PCS | Mod: PBBFAC,,, | Performed by: NURSE PRACTITIONER

## 2021-06-22 PROCEDURE — 3008F BODY MASS INDEX DOCD: CPT | Mod: CPTII,S$GLB,, | Performed by: NURSE PRACTITIONER

## 2021-06-22 PROCEDURE — 3008F PR BODY MASS INDEX (BMI) DOCUMENTED: ICD-10-PCS | Mod: CPTII,S$GLB,, | Performed by: NURSE PRACTITIONER

## 2021-06-22 PROCEDURE — 99999 PR PBB SHADOW E&M-EST. PATIENT-LVL IV: CPT | Mod: PBBFAC,,, | Performed by: NURSE PRACTITIONER

## 2021-06-22 RX ORDER — FLUTICASONE FUROATE AND VILANTEROL TRIFENATATE 100; 25 UG/1; UG/1
1 POWDER RESPIRATORY (INHALATION) DAILY
Qty: 180 EACH | Refills: 3 | Status: SHIPPED | OUTPATIENT
Start: 2021-06-22 | End: 2022-12-23

## 2021-09-18 ENCOUNTER — PATIENT MESSAGE (OUTPATIENT)
Dept: PULMONOLOGY | Facility: CLINIC | Age: 31
End: 2021-09-18

## 2021-09-21 DIAGNOSIS — J45.20 MILD INTERMITTENT ASTHMA WITHOUT COMPLICATION: Primary | ICD-10-CM

## 2021-09-22 ENCOUNTER — PATIENT MESSAGE (OUTPATIENT)
Dept: INTERNAL MEDICINE | Facility: CLINIC | Age: 31
End: 2021-09-22

## 2021-09-27 ENCOUNTER — OFFICE VISIT (OUTPATIENT)
Dept: INTERNAL MEDICINE | Facility: CLINIC | Age: 31
End: 2021-09-27
Payer: COMMERCIAL

## 2021-09-27 DIAGNOSIS — J45.909 ASTHMA, UNSPECIFIED ASTHMA SEVERITY, UNSPECIFIED WHETHER COMPLICATED, UNSPECIFIED WHETHER PERSISTENT: ICD-10-CM

## 2021-09-27 DIAGNOSIS — J06.9 URI WITH COUGH AND CONGESTION: Primary | ICD-10-CM

## 2021-09-27 PROCEDURE — 99213 OFFICE O/P EST LOW 20 MIN: CPT | Mod: 95,,, | Performed by: FAMILY MEDICINE

## 2021-09-27 PROCEDURE — 99213 PR OFFICE/OUTPT VISIT, EST, LEVL III, 20-29 MIN: ICD-10-PCS | Mod: 95,,, | Performed by: FAMILY MEDICINE

## 2021-09-27 RX ORDER — METHYLPREDNISOLONE 4 MG/1
TABLET ORAL
Qty: 1 PACKAGE | Refills: 0 | Status: SHIPPED | OUTPATIENT
Start: 2021-09-27 | End: 2021-10-18

## 2021-09-27 RX ORDER — ALBUTEROL SULFATE 2 MG/5ML
2 SYRUP ORAL 3 TIMES DAILY
Qty: 70 ML | Refills: 0 | Status: SHIPPED | OUTPATIENT
Start: 2021-09-27 | End: 2022-06-22

## 2021-12-23 DIAGNOSIS — J06.9 URI WITH COUGH AND CONGESTION: ICD-10-CM

## 2021-12-23 DIAGNOSIS — J45.909 ASTHMA, UNSPECIFIED ASTHMA SEVERITY, UNSPECIFIED WHETHER COMPLICATED, UNSPECIFIED WHETHER PERSISTENT: ICD-10-CM

## 2021-12-27 RX ORDER — MONTELUKAST SODIUM 10 MG/1
10 TABLET ORAL DAILY
Qty: 90 TABLET | Refills: 0 | Status: SHIPPED | OUTPATIENT
Start: 2021-12-27 | End: 2022-06-22 | Stop reason: SDUPTHER

## 2021-12-27 RX ORDER — METHYLPREDNISOLONE 4 MG/1
TABLET ORAL
Qty: 21 EACH | OUTPATIENT
Start: 2021-12-27

## 2022-06-22 ENCOUNTER — HOSPITAL ENCOUNTER (OUTPATIENT)
Dept: RADIOLOGY | Facility: HOSPITAL | Age: 32
Discharge: HOME | End: 2022-06-22
Attending: NURSE PRACTITIONER
Payer: COMMERCIAL

## 2022-06-22 ENCOUNTER — OFFICE VISIT (OUTPATIENT)
Dept: PULMONOLOGY | Facility: CLINIC | Age: 32
End: 2022-06-22
Payer: COMMERCIAL

## 2022-06-22 ENCOUNTER — CLINICAL SUPPORT (OUTPATIENT)
Dept: PULMONOLOGY | Facility: CLINIC | Age: 32
End: 2022-06-22
Payer: COMMERCIAL

## 2022-06-22 VITALS
WEIGHT: 148.56 LBS | DIASTOLIC BLOOD PRESSURE: 70 MMHG | OXYGEN SATURATION: 99 % | SYSTOLIC BLOOD PRESSURE: 125 MMHG | HEART RATE: 80 BPM | BODY MASS INDEX: 19.69 KG/M2 | HEIGHT: 73 IN | RESPIRATION RATE: 16 BRPM

## 2022-06-22 DIAGNOSIS — J45.20 MILD INTERMITTENT ASTHMA WITHOUT COMPLICATION: ICD-10-CM

## 2022-06-22 DIAGNOSIS — Z87.09 HISTORY OF PNEUMOTHORAX: ICD-10-CM

## 2022-06-22 DIAGNOSIS — J30.89 NON-SEASONAL ALLERGIC RHINITIS, UNSPECIFIED TRIGGER: ICD-10-CM

## 2022-06-22 DIAGNOSIS — J45.20 MILD INTERMITTENT ASTHMA WITHOUT COMPLICATION: Primary | ICD-10-CM

## 2022-06-22 LAB
BRPFT: NORMAL
DLCO ADJ PRE: 33.33 ML/(MIN*MMHG)
DLCO SINGLE BREATH LLN: 30.72
DLCO SINGLE BREATH PRE REF: 88.5 %
DLCO SINGLE BREATH REF: 37.65
DLCOC SBVA LLN: 3.73
DLCOC SBVA PRE REF: 94.9 %
DLCOC SBVA REF: 4.89
DLCOC SINGLE BREATH LLN: 30.72
DLCOC SINGLE BREATH PRE REF: 88.5 %
DLCOC SINGLE BREATH REF: 37.65
DLCOVA LLN: 3.73
DLCOVA PRE REF: 94.9 %
DLCOVA PRE: 4.64 ML/(MIN*MMHG*L)
DLCOVA REF: 4.89
DLVAADJ PRE: 4.64 ML/(MIN*MMHG*L)
ERV LLN: -16448.33
ERV PRE REF: 96.1 %
ERV REF: 1.67
FEF 25 75 CHG: 19.5 %
FEF 25 75 LLN: 3.05
FEF 25 75 POST REF: 68.7 %
FEF 25 75 PRE REF: 57.5 %
FEF 25 75 REF: 4.91
FET100 CHG: 10.5 %
FEV1 CHG: 7.6 %
FEV1 FVC CHG: 5 %
FEV1 FVC LLN: 71
FEV1 FVC POST REF: 90 %
FEV1 FVC PRE REF: 85.7 %
FEV1 FVC REF: 82
FEV1 LLN: 3.92
FEV1 POST REF: 86.8 %
FEV1 PRE REF: 80.7 %
FEV1 REF: 4.91
FRCPLETH LLN: 2.51
FRCPLETH PREREF: 154.2 %
FRCPLETH REF: 3.5
FVC CHG: 2.4 %
FVC LLN: 4.84
FVC POST REF: 95.8 %
FVC PRE REF: 93.6 %
FVC REF: 6.01
IVC PRE: 5.18 L
IVC SINGLE BREATH LLN: 4.84
IVC SINGLE BREATH PRE REF: 86.3 %
IVC SINGLE BREATH REF: 6.01
MVV LLN: 157
MVV PRE REF: 76.8 %
MVV REF: 185
PEF CHG: -5.4 %
PEF LLN: 8.39
PEF POST REF: 80.6 %
PEF PRE REF: 85.2 %
PEF REF: 10.9
POST FEF 25 75: 3.37 L/S
POST FET 100: 8.5 SEC
POST FEV1 FVC: 74.13 %
POST FEV1: 4.27 L
POST FVC: 5.75 L
POST PEF: 8.79 L/S
PRE DLCO: 33.33 ML/(MIN*MMHG)
PRE ERV: 1.6 L
PRE FEF 25 75: 2.82 L/S
PRE FET 100: 7.7 SEC
PRE FEV1 FVC: 70.57 %
PRE FEV1: 3.97 L
PRE FRC PL: 5.4 L
PRE FVC: 5.62 L
PRE MVV: 142 L/MIN
PRE PEF: 9.29 L/S
PRE RV: 3.71 L
PRE TLC: 9.4 L
RAW LLN: 3.06
RAW PRE REF: 54.4 %
RAW PRE: 1.66 CMH2O*S/L
RAW REF: 3.06
RV LLN: 1.16
RV PRE REF: 202.6 %
RV REF: 1.83
RVTLC LLN: 16
RVTLC PRE REF: 156.3 %
RVTLC PRE: 39.49 %
RVTLC REF: 25
TLC LLN: 6.55
TLC PRE REF: 122 %
TLC REF: 7.7
VA PRE: 7.18 L
VA SINGLE BREATH LLN: 7.55
VA SINGLE BREATH PRE REF: 95.1 %
VA SINGLE BREATH REF: 7.55
VC LLN: 4.84
VC PRE REF: 94.7 %
VC PRE: 5.69 L
VC REF: 6.01
VTGRAWPRE: 5.61 L

## 2022-06-22 PROCEDURE — 99999 PR PBB SHADOW E&M-EST. PATIENT-LVL IV: CPT | Mod: PBBFAC,,, | Performed by: NURSE PRACTITIONER

## 2022-06-22 PROCEDURE — 94060 PR EVAL OF BRONCHOSPASM: ICD-10-PCS | Mod: S$GLB,,, | Performed by: INTERNAL MEDICINE

## 2022-06-22 PROCEDURE — 3008F BODY MASS INDEX DOCD: CPT | Mod: CPTII,S$GLB,, | Performed by: NURSE PRACTITIONER

## 2022-06-22 PROCEDURE — 71046 X-RAY EXAM CHEST 2 VIEWS: CPT | Mod: 26,,, | Performed by: RADIOLOGY

## 2022-06-22 PROCEDURE — 1159F PR MEDICATION LIST DOCUMENTED IN MEDICAL RECORD: ICD-10-PCS | Mod: CPTII,S$GLB,, | Performed by: NURSE PRACTITIONER

## 2022-06-22 PROCEDURE — 3078F DIAST BP <80 MM HG: CPT | Mod: CPTII,S$GLB,, | Performed by: NURSE PRACTITIONER

## 2022-06-22 PROCEDURE — 1160F PR REVIEW ALL MEDS BY PRESCRIBER/CLIN PHARMACIST DOCUMENTED: ICD-10-PCS | Mod: CPTII,S$GLB,, | Performed by: NURSE PRACTITIONER

## 2022-06-22 PROCEDURE — 99999 PR PBB SHADOW E&M-EST. PATIENT-LVL IV: ICD-10-PCS | Mod: PBBFAC,,, | Performed by: NURSE PRACTITIONER

## 2022-06-22 PROCEDURE — 99214 PR OFFICE/OUTPT VISIT, EST, LEVL IV, 30-39 MIN: ICD-10-PCS | Mod: 25,S$GLB,, | Performed by: NURSE PRACTITIONER

## 2022-06-22 PROCEDURE — 99999 PR PBB SHADOW E&M-EST. PATIENT-LVL I: CPT | Mod: PBBFAC,,,

## 2022-06-22 PROCEDURE — 3074F SYST BP LT 130 MM HG: CPT | Mod: CPTII,S$GLB,, | Performed by: NURSE PRACTITIONER

## 2022-06-22 PROCEDURE — 3008F PR BODY MASS INDEX (BMI) DOCUMENTED: ICD-10-PCS | Mod: CPTII,S$GLB,, | Performed by: NURSE PRACTITIONER

## 2022-06-22 PROCEDURE — 71046 X-RAY EXAM CHEST 2 VIEWS: CPT | Mod: TC

## 2022-06-22 PROCEDURE — 1159F MED LIST DOCD IN RCRD: CPT | Mod: CPTII,S$GLB,, | Performed by: NURSE PRACTITIONER

## 2022-06-22 PROCEDURE — 94726 PLETHYSMOGRAPHY LUNG VOLUMES: CPT | Mod: S$GLB,,, | Performed by: INTERNAL MEDICINE

## 2022-06-22 PROCEDURE — 1160F RVW MEDS BY RX/DR IN RCRD: CPT | Mod: CPTII,S$GLB,, | Performed by: NURSE PRACTITIONER

## 2022-06-22 PROCEDURE — 94726 PULM FUNCT TST PLETHYSMOGRAP: ICD-10-PCS | Mod: S$GLB,,, | Performed by: INTERNAL MEDICINE

## 2022-06-22 PROCEDURE — 3074F PR MOST RECENT SYSTOLIC BLOOD PRESSURE < 130 MM HG: ICD-10-PCS | Mod: CPTII,S$GLB,, | Performed by: NURSE PRACTITIONER

## 2022-06-22 PROCEDURE — 99214 OFFICE O/P EST MOD 30 MIN: CPT | Mod: 25,S$GLB,, | Performed by: NURSE PRACTITIONER

## 2022-06-22 PROCEDURE — 3078F PR MOST RECENT DIASTOLIC BLOOD PRESSURE < 80 MM HG: ICD-10-PCS | Mod: CPTII,S$GLB,, | Performed by: NURSE PRACTITIONER

## 2022-06-22 PROCEDURE — 71046 XR CHEST PA AND LATERAL: ICD-10-PCS | Mod: 26,,, | Performed by: RADIOLOGY

## 2022-06-22 PROCEDURE — 94729 PR C02/MEMBANE DIFFUSE CAPACITY: ICD-10-PCS | Mod: S$GLB,,, | Performed by: INTERNAL MEDICINE

## 2022-06-22 PROCEDURE — 94060 EVALUATION OF WHEEZING: CPT | Mod: S$GLB,,, | Performed by: INTERNAL MEDICINE

## 2022-06-22 PROCEDURE — 94729 DIFFUSING CAPACITY: CPT | Mod: S$GLB,,, | Performed by: INTERNAL MEDICINE

## 2022-06-22 PROCEDURE — 99999 PR PBB SHADOW E&M-EST. PATIENT-LVL I: ICD-10-PCS | Mod: PBBFAC,,,

## 2022-06-22 RX ORDER — FLUTICASONE FUROATE AND VILANTEROL 100; 25 UG/1; UG/1
1 POWDER RESPIRATORY (INHALATION) DAILY
Qty: 180 EACH | Refills: 3 | Status: CANCELLED | OUTPATIENT
Start: 2022-06-22

## 2022-06-22 RX ORDER — FLUTICASONE PROPIONATE 50 MCG
2 SPRAY, SUSPENSION (ML) NASAL DAILY
Qty: 48 G | Refills: 4 | Status: SHIPPED | OUTPATIENT
Start: 2022-06-22

## 2022-06-22 RX ORDER — ALBUTEROL SULFATE 0.83 MG/ML
2.5 SOLUTION RESPIRATORY (INHALATION) 4 TIMES DAILY PRN
Qty: 150 ML | Refills: 11 | Status: CANCELLED | OUTPATIENT
Start: 2022-06-22

## 2022-06-22 RX ORDER — ALBUTEROL SULFATE 90 UG/1
2 AEROSOL, METERED RESPIRATORY (INHALATION) EVERY 6 HOURS PRN
Qty: 18 G | Refills: 11 | Status: SHIPPED | OUTPATIENT
Start: 2022-06-22

## 2022-06-22 RX ORDER — MONTELUKAST SODIUM 10 MG/1
10 TABLET ORAL DAILY
Qty: 90 TABLET | Refills: 4 | Status: SHIPPED | OUTPATIENT
Start: 2022-06-22

## 2022-06-22 NOTE — PROGRESS NOTES
Chief Complaint   Patient presents with    Asthma       Subjective:       Quinn Cardozo is a 31 y.o. male who presents for evaluation of asthma review CPFT, chest xray.      The patient has a history of asthma.  Age when diagnosed with Asthma 15-16 years of age.    The patient is not currently have symptoms / an exacerbation.     Controlled on current regimen:  Albuterol inhaler. Albuterol nebs. Singulair.  No longer using controller Breo. Has not needed.     The patient has been having episodes for approximately no flares since november 2020.    Symptoms in previous episodes have included chest tightness and dyspnea, and typically last 2 days, rest helped. Previous episodes have been triggered by stress and allergies, Allergy to fresh cut grass and hay.   Treatments tried during prior episodes include inhaled corticosteroids, long-acting inhaled beta-adrenergic agonists and short-acting inhaled beta-adrenergic agonists, which usually provides complete resolution of symptoms.      Current Disease Severity  Quinn has no daytime asthma symptoms. He has no nighttime asthma symptoms. The patient is using short-acting beta agonists for symptom control about once a month. He has exacerbations requiring oral systemic corticosteroids 0 times per year. Current limitations in activity from asthma: none. Number of days of school or work missed in the last month: 0. Number of urgent/emergent visit in last year: 0    Occupational History:   Employed full time as   at [employer] in  . No occupational exposure to Asbestos, Silica,  Petrochemicals,  Fiber glass,  Saw Dust,  Grain Dust and  Pesticides.    Avocational Exposure:   None currently.     Pet Exposures:  None currently. Denies allergy to Dog and  cat dander.    Regular exercise: 3 times a week at gym.      Past Medical History: The following portions of the patient's history were reviewed and updated as appropriate:   He   "has a past surgical history that includes Tonsillectomy; Pleural scarification; and Lung lobectomy.  His family history includes Breast cancer in his maternal aunt and maternal grandmother; No Known Problems in his father and mother; Stroke in his other.  He  reports that he has never smoked. He has never used smokeless tobacco. He reports current alcohol use. He reports that he does not use drugs.  He has a current medication list which includes the following prescription(s): albuterol, albuterol, breo ellipta, montelukast, fluticasone propionate, hydroquinone, ketoconazole, omeprazole, and sars-cov-2 (covid-19).  He has No Known Allergies..    Review of Systems  Review of Systems   Constitutional: Negative for fever, chills, weight loss, weight gain, activity change, appetite change, fatigue and night sweats.   HENT: Negative for postnasal drip, rhinorrhea, sinus pressure, voice change and congestion.    Eyes: Negative for redness and itching.   Respiratory: Negative for snoring, cough, sputum production, chest tightness, shortness of breath, wheezing, orthopnea, asthma nighttime symptoms, dyspnea on extertion, use of rescue inhaler and somnolence.    Cardiovascular: Negative.  Negative for chest pain, palpitations and leg swelling.   Genitourinary: Negative for difficulty urinating and hematuria.   Endocrine: Negative for cold intolerance and heat intolerance.    Musculoskeletal: Negative for arthralgias, gait problem, joint swelling and myalgias.   Skin: Negative.    Gastrointestinal: Negative for nausea, vomiting, abdominal pain and acid reflux.   Neurological: Negative for dizziness, weakness, light-headedness and headaches.   Hematological: Negative for adenopathy. No excessive bruising.   All other systems reviewed and are negative.       Objective:     /70   Pulse 80   Resp 16   Ht 6' 1" (1.854 m)   Wt 67.4 kg (148 lb 9.4 oz)   SpO2 99%   BMI 19.60 kg/m²   Physical Exam  Vitals and nursing note " reviewed.   Constitutional:       General: He is not in acute distress.     Appearance: He is well-developed. He is not ill-appearing or toxic-appearing.   HENT:      Head: Normocephalic and atraumatic.      Right Ear: External ear normal.      Left Ear: External ear normal.      Nose: Nose normal.      Mouth/Throat:      Pharynx: No oropharyngeal exudate.   Eyes:      Conjunctiva/sclera: Conjunctivae normal.   Cardiovascular:      Rate and Rhythm: Normal rate and regular rhythm.      Heart sounds: Normal heart sounds.   Pulmonary:      Effort: Pulmonary effort is normal.      Breath sounds: Normal breath sounds. No wheezing or rales.   Abdominal:      Palpations: Abdomen is soft.   Musculoskeletal:      Cervical back: Normal range of motion and neck supple.   Skin:     General: Skin is warm and dry.   Neurological:      Mental Status: He is alert and oriented to person, place, and time.   Psychiatric:         Behavior: Behavior normal. Behavior is cooperative.         Thought Content: Thought content normal.         Judgment: Judgment normal.       Diagnostic Testing Reviewed  All relevant imaging and labs reviewed.    6/22/2022 CPFT Spirometry shows a low FEF 25-75 suggesting mild obstruction may be present. Spirometry shows borderline improvement following bronchodilator. Lung volumes show mild pulmonary over inflation. Airway mechanics show normal airway resistance and conductance. DLCO is normal. MVV is mildly decreased.     [place] 12/7/2020 Normal spirometry. FEV1 99.5%    X-Ray Chest PA And Lateral  Narrative: EXAMINATION:  XR CHEST PA AND LATERAL    CLINICAL HISTORY:  Mild intermittent asthma, uncomplicated    TECHNIQUE:  PA and lateral views of the chest were performed.    COMPARISON:  11/19/2019    FINDINGS:  The lungs are clear and free of infiltrate.  No pleural effusion or pneumothorax. The heart is not enlarged.  Impression: 1.  No acute cardiopulmonary process.    Electronically signed by: Pedro Pablo  DO Ramu  Date:    2022  Time:    11:14        Assessment:     1. Mild intermittent asthma without complication    2. Non-seasonal allergic rhinitis, unspecified trigger    3. History of pneumothorax      Orders Placed This Encounter   Procedures    X-Ray Chest PA And Lateral     Standing Status:   Future     Standing Expiration Date:   2024     Order Specific Question:   May the Radiologist modify the order per protocol to meet the clinical needs of the patient?     Answer:   Yes     Order Specific Question:   Release to patient     Answer:   Immediate    Spirometry with/without bronchodilator     Standing Status:   Future     Standing Expiration Date:   2024     Order Specific Question:   Release to patient     Answer:   Immediate    Fraction of  Nitric Oxide     Standing Status:   Future     Standing Expiration Date:   2023     Order Specific Question:   Release to patient     Answer:   Immediate     Plan:     Problem List Items Addressed This Visit     Non-seasonal allergic rhinitis     Singulair, flonase            Relevant Medications    montelukast (SINGULAIR) 10 mg tablet    fluticasone propionate (FLONASE) 50 mcg/actuation nasal spray    Mild intermittent asthma without complication - Primary     Controlled.  Act score: 25, asthma scores 19 or greater indicate well controlled asthma   Continue current regimen: Albuterol inhaler. Albuterol nebs. Singulair.   Add back on ICS or LABA/ICS if needed   breo 100 mcg not used in past 1 year. Does not need refill not needed. breo is not on formulary, consider alternate if indicated.   2022 CPFT Spirometry shows a low FEF 25-75 suggesting mild obstruction may be present. Spirometry shows borderline improvement following bronchodilator. Lung volumes show mild pulmonary over inflation. Airway mechanics show normal airway resistance and conductance. DLCO is normal. MVV is mildly decreased.  Chest xray lungs clear   Follow up 1 year.  Cpft/cxr/FENO             Relevant Medications    albuterol (PROVENTIL/VENTOLIN HFA) 90 mcg/actuation inhaler    montelukast (SINGULAIR) 10 mg tablet    Other Relevant Orders    X-Ray Chest PA And Lateral    Spirometry with/without bronchodilator    Fraction of  Nitric Oxide    History of pneumothorax     Twice while in high school. 2007 steroid therapy. Then 2nd occurrence  with pleurodesis.                  Follow up in about 1 year (around 2023) for Asthma, w/review rickey/cxr/FENO.

## 2022-06-22 NOTE — ASSESSMENT & PLAN NOTE
Controlled.  Act score: 25, asthma scores 19 or greater indicate well controlled asthma   Continue current regimen: Albuterol inhaler. Albuterol nebs. Singulair.   Add back on ICS or LABA/ICS if needed   breo 100 mcg not used in past 1 year. Does not need refill not needed. breo is not on formulary, consider alternate if indicated.   6/22/2022 CPFT Spirometry shows a low FEF 25-75 suggesting mild obstruction may be present. Spirometry shows borderline improvement following bronchodilator. Lung volumes show mild pulmonary over inflation. Airway mechanics show normal airway resistance and conductance. DLCO is normal. MVV is mildly decreased.  Chest xray lungs clear   Follow up 1 year. Cpft/cxr/FENO

## 2022-06-22 NOTE — ADDENDUM NOTE
Addended by: GAMALIEL SHAHID on: 6/22/2022 11:47 AM     Modules accepted: Orders, Level of Service

## 2022-09-27 ENCOUNTER — OFFICE VISIT (OUTPATIENT)
Dept: INTERNAL MEDICINE | Facility: CLINIC | Age: 32
End: 2022-09-27
Payer: COMMERCIAL

## 2022-09-27 DIAGNOSIS — R09.81 NASAL CONGESTION: Primary | ICD-10-CM

## 2022-09-27 PROCEDURE — 99214 OFFICE O/P EST MOD 30 MIN: CPT | Mod: 95,,, | Performed by: PHYSICIAN ASSISTANT

## 2022-09-27 PROCEDURE — 1160F RVW MEDS BY RX/DR IN RCRD: CPT | Mod: CPTII,95,, | Performed by: PHYSICIAN ASSISTANT

## 2022-09-27 PROCEDURE — 1159F MED LIST DOCD IN RCRD: CPT | Mod: CPTII,95,, | Performed by: PHYSICIAN ASSISTANT

## 2022-09-27 PROCEDURE — 99214 PR OFFICE/OUTPT VISIT, EST, LEVL IV, 30-39 MIN: ICD-10-PCS | Mod: 95,,, | Performed by: PHYSICIAN ASSISTANT

## 2022-09-27 PROCEDURE — 1160F PR REVIEW ALL MEDS BY PRESCRIBER/CLIN PHARMACIST DOCUMENTED: ICD-10-PCS | Mod: CPTII,95,, | Performed by: PHYSICIAN ASSISTANT

## 2022-09-27 PROCEDURE — 1159F PR MEDICATION LIST DOCUMENTED IN MEDICAL RECORD: ICD-10-PCS | Mod: CPTII,95,, | Performed by: PHYSICIAN ASSISTANT

## 2022-09-27 RX ORDER — AZELASTINE 1 MG/ML
1 SPRAY, METERED NASAL 2 TIMES DAILY
Qty: 30 ML | Refills: 0 | Status: SHIPPED | OUTPATIENT
Start: 2022-09-27 | End: 2023-09-27

## 2022-09-27 NOTE — LETTER
September 27, 2022      [facility]  [id number] [place] [id number]  Phone: [phone]  Fax: [phone]       Patient: Quinn Cardozo   YOB: 1990  Date of Visit: 09/27/2022    To Whom It May Concern:    Ronal Cardozo  was at [facility] on 09/27/2022. The patient may return to work/school on 9/28/22 with no restrictions. If you have any questions or concerns, or if I can be of further assistance, please do not hesitate to contact me.    Sincerely,    Marianela Najera PA-C

## 2022-09-27 NOTE — PROGRESS NOTES
Subjective:      Patient ID: Quinn Cardozo Jr. is a 31 y.o. male.    Chief Complaint: No chief complaint on file.    The patient location is: [place]   The chief complaint leading to consultation is: sinus symptoms    Visit type: audiovisual    Face to Face time with patient: 2:25-2:30  10 minutes of total time spent on the encounter, which includes face to face time and non-face to face time preparing to see the patient (eg, review of tests), Obtaining and/or reviewing separately obtained history, Documenting clinical information in the electronic or other health record, Independently interpreting results (not separately reported) and communicating results to the patient/family/caregiver, or Care coordination (not separately reported).     Each patient to whom he or she provides medical services by telemedicine is:  (1) informed of the relationship between the physician and patient and the respective role of any other health care provider with respect to management of the patient; and (2) notified that he or she may decline to receive medical services by telemedicine and may withdraw from such care at any time.    Notes: Patient is new to me, being seen today for sinus/allergy symptoms.    2wks ago completed antibiotics (Zpak) and steroids per Urgent Care for sinus infection, symptoms resolved   They recommended ENT referral   H/o allergy symptoms that seem to be worsening, takes singulair and flonase     Last visit Sept 2021     Review of Systems   Constitutional:  Negative for chills, diaphoresis and fever.   HENT:  Positive for congestion (intermittent), ear pain and rhinorrhea. Negative for postnasal drip, sinus pressure, sinus pain, sneezing, sore throat and tinnitus.    Respiratory:  Negative for cough, shortness of breath and wheezing.    Gastrointestinal:  Negative for abdominal pain, constipation, diarrhea, nausea and vomiting.   Skin:  Negative for rash.   Neurological:  Negative for  dizziness, light-headedness and headaches.     Objective:   There were no vitals taken for this visit.  Physical Exam  Constitutional:       General: He is not in acute distress.     Appearance: He is well-developed. He is not ill-appearing or diaphoretic.   HENT:      Head: Normocephalic and atraumatic.      Right Ear: External ear normal.      Left Ear: External ear normal.   Eyes:      General: Lids are normal.         Right eye: No discharge.         Left eye: No discharge.      Conjunctiva/sclera: Conjunctivae normal.      Right eye: Right conjunctiva is not injected.      Left eye: Left conjunctiva is not injected.   Pulmonary:      Effort: Pulmonary effort is normal. No respiratory distress.   Skin:     General: Skin is warm and dry.      Findings: No rash.   Neurological:      Mental Status: He is alert and oriented to person, place, and time.   Psychiatric:         Speech: Speech normal.         Behavior: Behavior normal.         Thought Content: Thought content normal.         Judgment: Judgment normal.     Assessment:      1. Nasal congestion       Plan:   Nasal congestion  -     Ambulatory referral/consult to ENT; Future; Expected date: 10/04/2022  -     azelastine (ASTELIN) 137 mcg (0.1 %) nasal spray; 1 spray (137 mcg total) by Nasal route 2 (two) times daily.  Dispense: 30 mL; Refill: 0    ENT f/u     F/u PCP for annual     Discussed worsening signs/symptoms and when to return to clinic or go to ED.   Patient expresses understanding and agrees with treatment plan.

## 2022-10-06 ENCOUNTER — PATIENT MESSAGE (OUTPATIENT)
Dept: OTOLARYNGOLOGY | Facility: CLINIC | Age: 32
End: 2022-10-06
Payer: COMMERCIAL

## 2022-12-23 DIAGNOSIS — J45.20 MILD INTERMITTENT ASTHMA WITHOUT COMPLICATION: ICD-10-CM

## 2022-12-23 RX ORDER — FLUTICASONE FUROATE AND VILANTEROL 100; 25 UG/1; UG/1
POWDER RESPIRATORY (INHALATION)
Qty: 180 EACH | Refills: 3 | Status: SHIPPED | OUTPATIENT
Start: 2022-12-23

## 2022-12-27 ENCOUNTER — PATIENT MESSAGE (OUTPATIENT)
Dept: PULMONOLOGY | Facility: CLINIC | Age: 32
End: 2022-12-27
Payer: COMMERCIAL

## 2022-12-27 DIAGNOSIS — J45.20 MILD INTERMITTENT ASTHMA WITHOUT COMPLICATION: Primary | ICD-10-CM

## 2022-12-28 RX ORDER — ALBUTEROL SULFATE 0.83 MG/ML
2.5 SOLUTION RESPIRATORY (INHALATION) EVERY 4 HOURS PRN
Qty: 180 ML | Refills: 11 | Status: SHIPPED | OUTPATIENT
Start: 2022-12-28

## 2023-01-03 ENCOUNTER — OFFICE VISIT (OUTPATIENT)
Dept: PULMONOLOGY | Facility: CLINIC | Age: 33
End: 2023-01-03
Payer: COMMERCIAL

## 2023-01-03 ENCOUNTER — TELEPHONE (OUTPATIENT)
Dept: PULMONOLOGY | Facility: CLINIC | Age: 33
End: 2023-01-03
Payer: COMMERCIAL

## 2023-01-03 VITALS — BODY MASS INDEX: 19.61 KG/M2 | HEIGHT: 73 IN | WEIGHT: 148 LBS

## 2023-01-03 DIAGNOSIS — R06.02 SOB (SHORTNESS OF BREATH) ON EXERTION: ICD-10-CM

## 2023-01-03 DIAGNOSIS — J45.20 MILD INTERMITTENT ASTHMA WITHOUT COMPLICATION: Primary | ICD-10-CM

## 2023-01-03 DIAGNOSIS — Z86.16 PERSONAL HISTORY OF COVID-19: ICD-10-CM

## 2023-01-03 PROCEDURE — 1160F PR REVIEW ALL MEDS BY PRESCRIBER/CLIN PHARMACIST DOCUMENTED: ICD-10-PCS | Mod: CPTII,95,, | Performed by: NURSE PRACTITIONER

## 2023-01-03 PROCEDURE — 3008F PR BODY MASS INDEX (BMI) DOCUMENTED: ICD-10-PCS | Mod: CPTII,95,, | Performed by: NURSE PRACTITIONER

## 2023-01-03 PROCEDURE — 1159F MED LIST DOCD IN RCRD: CPT | Mod: CPTII,95,, | Performed by: NURSE PRACTITIONER

## 2023-01-03 PROCEDURE — 99214 PR OFFICE/OUTPT VISIT, EST, LEVL IV, 30-39 MIN: ICD-10-PCS | Mod: 95,,, | Performed by: NURSE PRACTITIONER

## 2023-01-03 PROCEDURE — 3008F BODY MASS INDEX DOCD: CPT | Mod: CPTII,95,, | Performed by: NURSE PRACTITIONER

## 2023-01-03 PROCEDURE — 99214 OFFICE O/P EST MOD 30 MIN: CPT | Mod: 95,,, | Performed by: NURSE PRACTITIONER

## 2023-01-03 PROCEDURE — 1160F RVW MEDS BY RX/DR IN RCRD: CPT | Mod: CPTII,95,, | Performed by: NURSE PRACTITIONER

## 2023-01-03 PROCEDURE — 1159F PR MEDICATION LIST DOCUMENTED IN MEDICAL RECORD: ICD-10-PCS | Mod: CPTII,95,, | Performed by: NURSE PRACTITIONER

## 2023-01-03 RX ORDER — PREDNISONE 20 MG/1
TABLET ORAL
COMMUNITY
Start: 2022-09-13

## 2023-01-03 NOTE — TELEPHONE ENCOUNTER
Spoke to pt he is in [place]. Pt states visit is for a follow up after having covid. Pt is 148lbs and Act score is a 18.

## 2023-01-03 NOTE — PROGRESS NOTES
The patient location is: [facility]  The chief complaint leading to consultation is: shortness of breath exertion post covid/asthma    Visit type: audiovisual    Face to Face time with patient: 30 minutes of total time spent on the encounter, which includes face to face time and non-face to face time preparing to see the patient (eg, review of tests), Obtaining and/or reviewing separately obtained history, Documenting clinical information in the electronic or other health record, Independently interpreting results (not separately reported) and communicating results to the patient/family/caregiver, or Care coordination (not separately reported).     Each patient to whom he or she provides medical services by telemedicine is:  (1) informed of the relationship between the physician and patient and the respective role of any other health care provider with respect to management of the patient; and (2) notified that he or she may decline to receive medical services by telemedicine and may withdraw from such care at any time.    Notes:     Chief Complaint   Patient presents with    Asthma    + covid on 12/21/2022       Subjective:       Quinn Cardozo is a 32 y.o. male who presents for virtual visit related to having + covid on 12/21/2022 tested at .     He noticed at onset of covid cough, body aches, then frontal headache over past weekend after + Covid.   No antiviral provided/taken. He felt improved by day 4-5 by 12/25/2022.     He went to  on 1/1/2022 related to headache, he had tried ibuprofen with some relief. He was given Fioricet by , which relieved headache.     1/3/2023 is his first day back work and doing well.      Dr Dexter, doc of day refilled medication requested Breo refilled on 12/23/2022 and albuterol neb solution on 12/28/2022.     He is aware of refills at pharmacy on requested pulmonary medication, has not picked up refills on Albuterol nebs or on Breo.      He took albuterol inhaler about 4  times as preventive with cough. Albuterol inhaler made him jittery so cut back on use.     He has diagnosis of asthma.     The patient has a history of asthma.  Age when diagnosed with Asthma 15-16 years of age.     The patient is currently have symptoms of slight cough, some shortness of breath on exertion. No mucous. He has noticed much improved as of today.     Controlled on current regimen:  Albuterol inhaler. Albuterol nebs. Singulair.  No longer using controller Breo. Has not needed.     The patient has been having episodes for approximately  no flares since november 2020.     Symptoms in previous episodes have included chest tightness and dyspnea, and typically last 2 days, rest helped. Previous episodes have been triggered by  stress and allergies , Allergy to fresh cut grass and hay.   Treatments tried during prior episodes include inhaled corticosteroids, long-acting inhaled beta-adrenergic agonists and short-acting inhaled beta-adrenergic agonists, which usually provides complete resolution of symptoms.      Current Disease Severity  Quinn has no daytime asthma symptoms. He has no nighttime asthma symptoms. The patient is using short-acting beta agonists for symptom control  about once a month . He has exacerbations requiring oral systemic corticosteroids 0 times per year. Current limitations in activity from asthma: none. Number of days of school or work missed in the last month: 0. Number of urgent/emergent visit in last year: 0    Occupational History:   Employed full time as   at [employer] in  . No occupational exposure to Asbestos, Silica,  Petrochemicals,  Fiber glass,  Saw Dust,  Grain Dust and  Pesticides.    Avocational Exposure:   None currently.     Pet Exposures:  None currently. Denies allergy to Dog and  cat dander.    Regular exercise: 3 times a week at gym.      Past Medical History: The following portions of the patient's history were reviewed  and updated as appropriate:   He  has a past surgical history that includes Tonsillectomy; Pleural scarification; and Lung lobectomy.  His family history includes Breast cancer in his maternal aunt and maternal grandmother; No Known Problems in his father and mother; Stroke in an other family member.  He  reports that he has never smoked. He has never used smokeless tobacco. He reports current alcohol use. He reports that he does not use drugs.  He has a current medication list which includes the following prescription(s): albuterol, albuterol, azelastine, d-methorphan/pe/dexbromphenir, fluticasone furoate-vilanterol, fluticasone propionate, hydroquinone, ketoconazole, montelukast, omeprazole, prednisone, and sars-cov-2 (covid-19).  He has No Known Allergies..    Review of Systems  Review of Systems   Constitutional:  Negative for fever, chills, weight loss, weight gain, activity change, appetite change, fatigue and night sweats.   HENT:  Negative for postnasal drip, rhinorrhea, sinus pressure, voice change and congestion.    Eyes:  Negative for redness and itching.   Respiratory:  Positive for cough (mild) and shortness of breath (slight on exertion). Negative for snoring, sputum production, chest tightness, wheezing, orthopnea, asthma nighttime symptoms, dyspnea on extertion, use of rescue inhaler and somnolence.    Cardiovascular: Negative.  Negative for chest pain, palpitations and leg swelling.   Genitourinary:  Negative for difficulty urinating and hematuria.   Endocrine:  Negative for cold intolerance and heat intolerance.    Musculoskeletal:  Negative for arthralgias, gait problem, joint swelling and myalgias.   Skin: Negative.    Gastrointestinal:  Negative for nausea, vomiting, abdominal pain and acid reflux.   Neurological:  Negative for dizziness, weakness, light-headedness and headaches.   Hematological:  Negative for adenopathy. No excessive bruising.   All other systems reviewed and are negative.    "  Objective:     Ht 6' 1" (1.854 m)   Wt 67.1 kg (148 lb)   BMI 19.53 kg/m²   Physical Exam  Vitals and nursing note reviewed.   Constitutional:       General: He is awake.      Appearance: Normal appearance. He is well-developed and well-groomed.   HENT:      Head: Normocephalic.   Eyes:      Conjunctiva/sclera: Conjunctivae normal.   Pulmonary:      Effort: Pulmonary effort is normal.   Neurological:      Mental Status: He is alert.   Psychiatric:         Mood and Affect: Mood normal.         Behavior: Behavior normal. Behavior is cooperative.         Thought Content: Thought content normal.         Judgment: Judgment normal.     Diagnostic Testing Reviewed  All relevant imaging and labs reviewed.    2022 CPFT Spirometry shows a low FEF 25-75 suggesting mild obstruction may be present. Spirometry shows borderline improvement following bronchodilator. Lung volumes show mild pulmonary over inflation. Airway mechanics show normal airway resistance and conductance. DLCO is normal. MVV is mildly decreased.     [place] 2020 Normal spirometry. FEV1 99.5%    X-Ray Chest PA And Lateral  Narrative: EXAMINATION:  XR CHEST PA AND LATERAL    CLINICAL HISTORY:  Mild intermittent asthma, uncomplicated    TECHNIQUE:  PA and lateral views of the chest were performed.    COMPARISON:  2019    FINDINGS:  The lungs are clear and free of infiltrate.  No pleural effusion or pneumothorax. The heart is not enlarged.  Impression: 1.  No acute cardiopulmonary process.    Electronically signed by: Pedro Pablo Guallpa DO  Date:    2022  Time:    11:14    Assessment:     1. Mild intermittent asthma without complication    2. Personal history of COVID-19    3. SOB (shortness of breath) on exertion        Orders Placed This Encounter   Procedures    Fraction of  Nitric Oxide     Standing Status:   Future     Standing Expiration Date:   1/3/2024     Order Specific Question:   Release to patient     Answer:   Immediate "     Plan:     Problem List Items Addressed This Visit       Mild intermittent asthma without complication - Primary    Relevant Orders    Fraction of  Nitric Oxide     Other Visit Diagnoses       Personal history of COVID-19        Relevant Orders    Fraction of  Nitric Oxide    SOB (shortness of breath) on exertion        Relevant Orders    Fraction of  Nitric Oxide          Feno 2023 in morning. Plan addendum to todays office virtual visit after results to determine if indication to resume breo. Currently he is stable. No wheezing. Minimal cough. Slight shortness of breath exertional with returning to work today, Employed full time as   at [employer] in  .     At close of visit patient did not compete 2023 feno testing planned, scheduled presently on 2023.    Follow up for 1/3/2023 Asthma feno testing. Keep annual fu as planned for 2023, fu sooner if needed .

## 2023-01-04 ENCOUNTER — PATIENT MESSAGE (OUTPATIENT)
Dept: PULMONOLOGY | Facility: CLINIC | Age: 33
End: 2023-01-04
Payer: COMMERCIAL

## 2023-01-16 ENCOUNTER — PATIENT MESSAGE (OUTPATIENT)
Dept: PULMONOLOGY | Facility: CLINIC | Age: 33
End: 2023-01-16
Payer: COMMERCIAL

## 2023-01-18 ENCOUNTER — CLINICAL SUPPORT (OUTPATIENT)
Dept: PULMONOLOGY | Facility: CLINIC | Age: 33
End: 2023-01-18
Payer: COMMERCIAL

## 2023-01-18 DIAGNOSIS — R06.02 SOB (SHORTNESS OF BREATH) ON EXERTION: ICD-10-CM

## 2023-01-18 DIAGNOSIS — Z86.16 PERSONAL HISTORY OF COVID-19: ICD-10-CM

## 2023-01-18 DIAGNOSIS — J45.20 MILD INTERMITTENT ASTHMA WITHOUT COMPLICATION: ICD-10-CM

## 2023-01-18 PROCEDURE — 95012 PR NITRIC OXIDE EXPIRED GAS DETERMINATION: ICD-10-PCS | Mod: S$GLB,,, | Performed by: INTERNAL MEDICINE

## 2023-01-18 PROCEDURE — 95012 NITRIC OXIDE EXP GAS DETER: CPT | Mod: S$GLB,,, | Performed by: INTERNAL MEDICINE

## 2023-01-18 NOTE — PROCEDURES
Clinical Guide to Interpretation or FeNO Levels :    FeNO  (ppb) LOW INTERMEDIATE HIGH   ADULT VALUES < 25 25-50          > 50   Th2-driven Inflammation Unlikely Likely Significant     Patients FeNO level at this visit : 11 (ppb)    Interpretation of FeNO measurement in adults:  [x] FENO is less than 25 ppb implies non eosinophilic airway inflammation or the absence of airway inflammation.    Comment: Low FENO (<25 ppb) in adult asthmatics with persistent symptoms suggests other etiologies for these symptoms and a lower likelihood of benefit from adding or increasing inhaled glucocorticoids.    [] FENO between 25 and 50 ppb in adults should be interpreted cautiously with reference to the clinical situation (eg, symptomatic, on or off therapy, current smoking).    [] FENO greater than 50 ppb in adults  suggests eosinophilic airway inflammation   Comment: High FENO (>50 ppb) in adult asthmatics even with atypical symptoms suggests glucocorticoid responsiveness. High FENO (>50 ppb) can help identify poor adherence or uncontrolled inflammation in asthma patients with otherwise seemingly controlled asthma.    Discussion:  A FENO less than 25 ppb in adults and less than 20 ppb in children younger than 12 years of age implies noneosinophilic airway inflammation or the absence of airway inflammation.  A FENO greater than 50 ppb in adults or greater than 35 ppb in children suggests eosinophilic airway inflammation.   Values of FENO between 25 and 50 ppb in adults (20 to 35 ppb in children) should be interpreted cautiously with reference to the clinical situation (eg, symptomatic, on or off therapy, current smoking).  A rising FENO with a greater than 20 percent change and more than 25 ppb (20 ppb in children) from a previously stable level suggests increasing eosinophilic airway inflammation, but there are wide inter-individual differences.  A decrease in FENO greater than 20 percent for values over 50 ppb or more than 10  ppb for values less than 50 ppb may be clinically important.  ?FENO in other respiratory diseases - Several other diseases are associated with altered levels of exhaled NO: low levels of FENO have been noted in cystic fibrosis, current smoking, pulmonary hypertension, hypothermia, primary ciliary dyskinesia, and bronchopulmonary dysplasia. Elevated FENO has been noted in atopy, nonasthmatic eosinophilic bronchitis, COPD exacerbations, noncystic fibrosis bronchiectasis, and viral upper respiratory infections.    REFERENCE:  ATS Board of Directors, December 2004, and by the ERS Executive Committee, June 2004. ATS/ERS Recommendations for Standardized Procedures for the Online and Offline Measurement of Exhaled Lower Respiratory Nitric Oxide and Nasal Nitric Oxide. Guideline 2005

## 2023-06-20 ENCOUNTER — TELEPHONE (OUTPATIENT)
Dept: PULMONOLOGY | Facility: CLINIC | Age: 33
End: 2023-06-20
Payer: COMMERCIAL

## 2023-06-20 NOTE — TELEPHONE ENCOUNTER
Patient no call/ no show for appointment. Called and LVM for patient to reschedule. Patient also has appointment with IRVIN Garcia. Will notify staff.

## 2023-07-07 ENCOUNTER — PATIENT MESSAGE (OUTPATIENT)
Dept: INFECTIOUS DISEASES | Facility: CLINIC | Age: 33
End: 2023-07-07
Payer: COMMERCIAL

## 2024-04-03 ENCOUNTER — PATIENT MESSAGE (OUTPATIENT)
Dept: PULMONOLOGY | Facility: CLINIC | Age: 34
End: 2024-04-03

## 2024-04-23 ENCOUNTER — OFFICE VISIT (OUTPATIENT)
Dept: INTERNAL MEDICINE | Facility: CLINIC | Age: 34
End: 2024-04-23

## 2024-04-23 DIAGNOSIS — J45.20 MILD INTERMITTENT ASTHMA WITHOUT COMPLICATION: Primary | ICD-10-CM

## 2024-04-23 PROCEDURE — 99213 OFFICE O/P EST LOW 20 MIN: CPT | Mod: 95,,,

## 2024-04-23 RX ORDER — PROMETHAZINE HYDROCHLORIDE AND DEXTROMETHORPHAN HYDROBROMIDE 6.25; 15 MG/5ML; MG/5ML
5 SYRUP ORAL EVERY 6 HOURS PRN
Qty: 180 ML | Refills: 0 | Status: SHIPPED | OUTPATIENT
Start: 2024-04-23 | End: 2024-05-03

## 2024-04-23 RX ORDER — METHYLPREDNISOLONE 4 MG/1
TABLET ORAL
Qty: 21 EACH | Refills: 0 | Status: SHIPPED | OUTPATIENT
Start: 2024-04-23 | End: 2024-05-14

## 2024-04-23 RX ORDER — FLUTICASONE FUROATE AND VILANTEROL 100; 25 UG/1; UG/1
1 POWDER RESPIRATORY (INHALATION) DAILY
Qty: 180 EACH | Refills: 0 | Status: SHIPPED | OUTPATIENT
Start: 2024-04-23

## 2024-04-23 NOTE — PROGRESS NOTES
Quinn Cardozo Jr.  04/23/2024  [id number]    Ryley Rain MD  Patient Care Team:  Ryley Rain MD as PCP - General (Family Medicine)  Lima Jamil LPN as Care Coordinator (Internal Medicine)          Visit Type:an urgent visit for a new problem  The patient location is: [place]  The chief complaint leading to consultation is: Asthma Flare Up    Visit type: audiovisual    Face to Face time with patient: 9   minutes of total time spent on the encounter, which includes face to face time and non-face to face time preparing to see the patient (eg, review of tests), Obtaining and/or reviewing separately obtained history, Documenting clinical information in the electronic or other health record, Independently interpreting results (not separately reported) and communicating results to the patient/family/caregiver, or Care coordination (not separately reported).         Each patient to whom he or she provides medical services by telemedicine is:  (1) informed of the relationship between the physician and patient and the respective role of any other health care provider with respect to management of the patient; and (2) notified that he or she may decline to receive medical services by telemedicine and may withdraw from such care at any time.    Notes:      Last week had URI  Took OTC Sudafeed and it help resolve his symptoms  He has asthma. Had to use albuterol inhaler  He has not had asthma flare up in months      History:  Past Medical History:   Diagnosis Date    Allergy     Asthma     Pneumothorax     x 2     Past Surgical History:   Procedure Laterality Date    LUNG LOBECTOMY      PLEURAL SCARIFICATION      TONSILLECTOMY       Family History   Problem Relation Name Age of Onset    No Known Problems Mother      No Known Problems Father      Stroke Other M-GGM     Breast cancer Maternal Aunt      Breast cancer Maternal Grandmother       Social History     Socioeconomic History    Marital status: Single     Number of children: 0   Occupational History    Occupation: Teacher     Comment: [employer]   Tobacco Use    Smoking status: Never    Smokeless tobacco: Never   Substance and Sexual Activity    Alcohol use: Yes     Comment: Rarely    Drug use: No    Sexual activity: Never     Social Determinants of Health     Financial Resource Strain: Low Risk  (4/23/2024)    Overall Financial Resource Strain (CARDIA)     Difficulty of Paying Living Expenses: Not hard at all   Food Insecurity: No Food Insecurity (4/23/2024)    Hunger Vital Sign     Worried About Running Out of Food in the Last Year: Never true     Ran Out of Food in the Last Year: Never true   Transportation Needs: No Transportation Needs (4/23/2024)    PRAPARE - Transportation     Lack of Transportation (Medical): No     Lack of Transportation (Non-Medical): No   Physical Activity: Insufficiently Active (4/23/2024)    Exercise Vital Sign     Days of Exercise per Week: 3 days     Minutes of Exercise per Session: 30 min   Stress: Stress Concern Present (4/23/2024)    [employer] [place] [employer] - Occupational Stress Questionnaire     Feeling of Stress : Rather much   Social Connections: Unknown (4/23/2024)    Social Connection and Isolation Panel [NHANES]     Frequency of Communication with Friends and Family: More than three times a week     Frequency of Social Gatherings with Friends and Family: Twice a week     Active Member of Clubs or Organizations: Yes     Attends Club or Organization Meetings: More than 4 times per year     Marital Status: Never    Housing Stability: High Risk (4/23/2024)    Housing Stability Vital Sign     Unable to Pay for Housing in the Last Year: Yes     Patient Active Problem List   Diagnosis    Non-seasonal allergic rhinitis    Mild intermittent asthma without complication    History of pneumothorax     Review of patient's allergies indicates:  No Known Allergies    The following were reviewed at this  visit: active problem list, medication list, allergies, family history, social history, and health maintenance.    Medications:  Current Outpatient Medications on File Prior to Visit   Medication Sig Dispense Refill    albuterol (PROVENTIL) 2.5 mg /3 mL (0.083 %) nebulizer solution Take 3 mLs (2.5 mg total) by nebulization every 4 (four) hours as needed for Wheezing (cough). 180 mL 11    albuterol (PROVENTIL/VENTOLIN HFA) 90 mcg/actuation inhaler Inhale 2 puffs into the lungs every 6 (six) hours as needed. Rescue 18 g 11    azelastine (ASTELIN) 137 mcg (0.1 %) nasal spray 1 spray (137 mcg total) by Nasal route 2 (two) times daily. 30 mL 0    d-methorphan/pe/dexbromphenir (DEXBROMPHENIRAMINE-PHENYLEP-DM ORAL) Alahist CF Take 1 tablet (oral) 4 times per day for 10 days [id number] tablet 4 times per day oral 10 days active 2-10-20 mg      fluticasone furoate-vilanteroL (BREO) 100-25 mcg/dose diskus inhaler INHALE 1 PUFF BY MOUTH ONCE DAILY (Patient not taking: Reported on 1/3/2023) 180 each 3    fluticasone propionate (FLONASE) 50 mcg/actuation nasal spray 2 sprays (100 mcg total) by Each Nostril route once daily. (Patient not taking: Reported on 1/3/2023) 48 g 4    hydroquinone 4 % Crea AAA of dark spots qd prn. Do Not use for longer than 3 months. (Patient not taking: Reported on 6/22/2022) 30 g 0    ketoconazole (NIZORAL) 2 % cream Apply topically 2 (two) times daily. PRN dryness and flaking. (Patient not taking: Reported on 6/22/2022) 30 g 0    montelukast (SINGULAIR) 10 mg tablet Take 1 tablet (10 mg total) by mouth once daily. 90 tablet 4    omeprazole (PRILOSEC) 40 MG capsule Take 1 capsule (40 mg total) by mouth once daily. (Patient not taking: Reported on 6/22/2022) 30 capsule 2    predniSONE (DELTASONE) 20 MG tablet prednisone Take 2 Tablet (oral) 1 time per day for 4 days [id number] tablet 1 time per day oral 4 days active 20 mg      sars-cov-2, covid-19, (MODERNA COVID-19) 100 mcg/0.5 ml injection        No  current facility-administered medications on file prior to visit.       Medications have been reviewed and reconciled with patient at this visit.  Barriers to medications reviewed with patient.    Adverse reactions to current medications reviewed with patient..    Over the counter medications reviewed and reconciled with patient.    Exam:  Wt Readings from Last 3 Encounters:   01/03/23 67.1 kg (148 lb)   06/22/22 67.4 kg (148 lb 9.4 oz)   06/22/21 67.2 kg (148 lb 2.4 oz)     Temp Readings from Last 3 Encounters:   12/08/20 97 °F (36.1 °C)   02/27/20 96 °F (35.6 °C)   08/02/16 (!) 95.4 °F (35.2 °C) (Tympanic)     BP Readings from Last 3 Encounters:   06/22/22 125/70   06/22/21 110/72   12/08/20 130/84     Pulse Readings from Last 3 Encounters:   06/22/22 80   06/22/21 72   12/08/20 95     There is no height or weight on file to calculate BMI.      Review of Systems   Constitutional:  Negative for chills, fever and weight loss.   HENT:  Negative for ear pain and sore throat.    Respiratory:  Positive for cough. Negative for hemoptysis, shortness of breath and wheezing.    Cardiovascular:  Negative for chest pain.   Gastrointestinal:  Negative for heartburn.   Musculoskeletal:  Negative for myalgias.   Neurological:  Negative for headaches.   Endo/Heme/Allergies:  Negative for environmental allergies.   Answers submitted by the patient for this visit:  Cough Questionnaire (Submitted on 4/23/2024)  Chief Complaint: Cough  Chronicity: recurrent  Onset: in the past 7 days  Progression since onset: gradually improving  Frequency: every few hours  Cough characteristics: non-productive  ear congestion: No  nasal congestion: Yes  postnasal drip: Yes  rhinorrhea: No  sweats: No  Aggravated by: nothing  Risk factors for lung disease: occupational exposure  asthma: Yes  bronchiectasis: No  bronchitis: No  COPD: No  emphysema: No  pneumonia: No  Treatments tried: OTC cough suppressant  Improvement on treatment:  moderate      Physical Exam  Nursing note reviewed.   Pulmonary:      Effort: Pulmonary effort is normal. No respiratory distress.   Neurological:      Mental Status: He is alert and oriented to person, place, and time.   Psychiatric:         Mood and Affect: Mood normal.         Behavior: Behavior normal.         Thought Content: Thought content normal.         Judgment: Judgment normal.         Laboratory Reviewed ({Yes)  Lab Results   Component Value Date    WBC 6.50 02/28/2020    HGB 14.8 02/28/2020    HCT 45.5 02/28/2020     02/28/2020    CHOL 160 02/28/2020    TRIG 91 02/28/2020    HDL 47 02/28/2020    ALT 17 02/28/2020    AST 15 02/28/2020     02/28/2020    K 4.6 02/28/2020     02/28/2020    CREATININE 1.2 02/28/2020    BUN 8 02/28/2020    CO2 28 02/28/2020    TSH 1.285 02/28/2020       Diagnoses and all orders for this visit:    Mild intermittent asthma without complication  -     methylPREDNISolone (MEDROL DOSEPACK) 4 mg tablet; use as directed  -     promethazine-dextromethorphan (PROMETHAZINE-DM) 6.25-15 mg/5 mL Syrp; Take 5 mLs by mouth every 6 (six) hours as needed (cough).  -     fluticasone furoate-vilanteroL (BREO) 100-25 mcg/dose diskus inhaler; Inhale 1 puff into the lungs once daily. Controller    Pt will schedule an annual exam during the summer   Discussed resuming daily ICS     Care Plan/Goals: Reviewed    Goals    None         Follow up: No follow-ups on file.    After visit summary was printed and given to patient upon discharge today.  Patient goals and care plan are included in After Visit Summary.

## 2024-04-23 NOTE — LETTER
April 23, 2024      [facility]  [id number] [place] [id number]  Phone: [phone]  Fax: [phone]       Patient: Quinn Cardozo   YOB: 1990  Date of Visit: 04/23/2024    To Whom It May Concern:    Ronal Cardozo  was at [facility] on 04/23/2024. The patient may return to work/school on 04/25/2024 with no restrictions. If you have any questions or concerns, or if I can be of further assistance, please do not hesitate to contact me.    Sincerely,         Tete Andujar NP

## 2024-06-28 ENCOUNTER — PATIENT MESSAGE (OUTPATIENT)
Dept: ADMINISTRATIVE | Facility: OTHER | Age: 34
End: 2024-06-28

## 2024-11-26 ENCOUNTER — LAB VISIT (OUTPATIENT)
Dept: LAB | Facility: HOSPITAL | Age: 34
End: 2024-11-26
Attending: FAMILY MEDICINE
Payer: COMMERCIAL

## 2024-11-26 ENCOUNTER — OFFICE VISIT (OUTPATIENT)
Dept: INTERNAL MEDICINE | Facility: CLINIC | Age: 34
End: 2024-11-26
Payer: COMMERCIAL

## 2024-11-26 VITALS
BODY MASS INDEX: 21.56 KG/M2 | TEMPERATURE: 98 F | OXYGEN SATURATION: 97 % | SYSTOLIC BLOOD PRESSURE: 128 MMHG | DIASTOLIC BLOOD PRESSURE: 78 MMHG | HEART RATE: 75 BPM | RESPIRATION RATE: 20 BRPM | WEIGHT: 159.19 LBS | HEIGHT: 72 IN

## 2024-11-26 DIAGNOSIS — J30.89 NON-SEASONAL ALLERGIC RHINITIS, UNSPECIFIED TRIGGER: ICD-10-CM

## 2024-11-26 DIAGNOSIS — Z29.81 ENCOUNTER FOR HIV PRE-EXPOSURE PROPHYLAXIS: ICD-10-CM

## 2024-11-26 DIAGNOSIS — J45.20 MILD INTERMITTENT ASTHMA WITHOUT COMPLICATION: ICD-10-CM

## 2024-11-26 DIAGNOSIS — R41.840 INATTENTION: Primary | ICD-10-CM

## 2024-11-26 DIAGNOSIS — Z11.59 ENCOUNTER FOR HEPATITIS C SCREENING TEST FOR LOW RISK PATIENT: ICD-10-CM

## 2024-11-26 DIAGNOSIS — Z11.3 SCREENING EXAMINATION FOR STI: ICD-10-CM

## 2024-11-26 DIAGNOSIS — Z79.899 ON PRE-EXPOSURE PROPHYLAXIS FOR HIV: ICD-10-CM

## 2024-11-26 DIAGNOSIS — R41.840 INATTENTION: ICD-10-CM

## 2024-11-26 LAB
ALBUMIN SERPL BCP-MCNC: 4.2 G/DL (ref 3.5–5.2)
ALP SERPL-CCNC: 76 U/L (ref 40–150)
ALT SERPL W/O P-5'-P-CCNC: 20 U/L (ref 10–44)
ANION GAP SERPL CALC-SCNC: 8 MMOL/L (ref 8–16)
AST SERPL-CCNC: 21 U/L (ref 10–40)
BASOPHILS # BLD AUTO: 0.03 K/UL (ref 0–0.2)
BASOPHILS NFR BLD: 0.5 % (ref 0–1.9)
BILIRUB SERPL-MCNC: 0.5 MG/DL (ref 0.1–1)
BUN SERPL-MCNC: 7 MG/DL (ref 6–20)
CALCIUM SERPL-MCNC: 9 MG/DL (ref 8.7–10.5)
CHLORIDE SERPL-SCNC: 106 MMOL/L (ref 95–110)
CO2 SERPL-SCNC: 25 MMOL/L (ref 23–29)
CREAT SERPL-MCNC: 1.1 MG/DL (ref 0.5–1.4)
DIFFERENTIAL METHOD BLD: ABNORMAL
EOSINOPHIL # BLD AUTO: 0 K/UL (ref 0–0.5)
EOSINOPHIL NFR BLD: 0.6 % (ref 0–8)
ERYTHROCYTE [DISTWIDTH] IN BLOOD BY AUTOMATED COUNT: 11.5 % (ref 11.5–14.5)
EST. GFR  (NO RACE VARIABLE): >60 ML/MIN/1.73 M^2
GLUCOSE SERPL-MCNC: 89 MG/DL (ref 70–110)
HCT VFR BLD AUTO: 46 % (ref 40–54)
HCV AB SERPL QL IA: NORMAL
HGB BLD-MCNC: 14.6 G/DL (ref 14–18)
HIV 1+2 AB+HIV1 P24 AG SERPL QL IA: NORMAL
IMM GRANULOCYTES # BLD AUTO: 0.01 K/UL (ref 0–0.04)
IMM GRANULOCYTES NFR BLD AUTO: 0.2 % (ref 0–0.5)
LYMPHOCYTES # BLD AUTO: 1.7 K/UL (ref 1–4.8)
LYMPHOCYTES NFR BLD: 26.3 % (ref 18–48)
MCH RBC QN AUTO: 31.5 PG (ref 27–31)
MCHC RBC AUTO-ENTMCNC: 31.7 G/DL (ref 32–36)
MCV RBC AUTO: 99 FL (ref 82–98)
MONOCYTES # BLD AUTO: 0.7 K/UL (ref 0.3–1)
MONOCYTES NFR BLD: 11.3 % (ref 4–15)
NEUTROPHILS # BLD AUTO: 3.9 K/UL (ref 1.8–7.7)
NEUTROPHILS NFR BLD: 61.1 % (ref 38–73)
NRBC BLD-RTO: 0 /100 WBC
PLATELET # BLD AUTO: 252 K/UL (ref 150–450)
PMV BLD AUTO: 10.8 FL (ref 9.2–12.9)
POTASSIUM SERPL-SCNC: 4.2 MMOL/L (ref 3.5–5.1)
PROT SERPL-MCNC: 7.3 G/DL (ref 6–8.4)
RBC # BLD AUTO: 4.64 M/UL (ref 4.6–6.2)
SODIUM SERPL-SCNC: 139 MMOL/L (ref 136–145)
WBC # BLD AUTO: 6.39 K/UL (ref 3.9–12.7)

## 2024-11-26 PROCEDURE — 85025 COMPLETE CBC W/AUTO DIFF WBC: CPT | Performed by: FAMILY MEDICINE

## 2024-11-26 PROCEDURE — 36415 COLL VENOUS BLD VENIPUNCTURE: CPT | Mod: PN | Performed by: FAMILY MEDICINE

## 2024-11-26 PROCEDURE — 80053 COMPREHEN METABOLIC PANEL: CPT | Performed by: FAMILY MEDICINE

## 2024-11-26 PROCEDURE — 86593 SYPHILIS TEST NON-TREP QUANT: CPT | Performed by: FAMILY MEDICINE

## 2024-11-26 PROCEDURE — 86803 HEPATITIS C AB TEST: CPT | Performed by: FAMILY MEDICINE

## 2024-11-26 PROCEDURE — 87389 HIV-1 AG W/HIV-1&-2 AB AG IA: CPT | Performed by: FAMILY MEDICINE

## 2024-11-26 PROCEDURE — 99999 PR PBB SHADOW E&M-EST. PATIENT-LVL IV: CPT | Mod: PBBFAC,,, | Performed by: FAMILY MEDICINE

## 2024-11-26 RX ORDER — MONTELUKAST SODIUM 10 MG/1
10 TABLET ORAL DAILY
Qty: 90 TABLET | Refills: 4 | Status: SHIPPED | OUTPATIENT
Start: 2024-11-26

## 2024-11-26 RX ORDER — EMTRICITABINE AND TENOFOVIR DISOPROXIL FUMARATE 200; 300 MG/1; MG/1
1 TABLET, FILM COATED ORAL DAILY
Qty: 30 TABLET | Refills: 11 | Status: SHIPPED | OUTPATIENT
Start: 2024-11-26 | End: 2025-11-26

## 2024-11-26 RX ORDER — FLUTICASONE PROPIONATE 50 MCG
2 SPRAY, SUSPENSION (ML) NASAL DAILY
Qty: 48 G | Refills: 4 | Status: SHIPPED | OUTPATIENT
Start: 2024-11-26

## 2024-11-27 ENCOUNTER — PATIENT MESSAGE (OUTPATIENT)
Dept: INTERNAL MEDICINE | Facility: CLINIC | Age: 34
End: 2024-11-27
Payer: COMMERCIAL

## 2024-11-27 PROBLEM — Z79.899 ON PRE-EXPOSURE PROPHYLAXIS FOR HIV: Status: ACTIVE | Noted: 2024-11-27

## 2024-11-27 LAB — TREPONEMA PALLIDUM IGG+IGM AB [PRESENCE] IN SERUM OR PLASMA BY IMMUNOASSAY: NONREACTIVE
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
## 2024-12-03 ENCOUNTER — TELEPHONE (OUTPATIENT)
Dept: INTERNAL MEDICINE | Facility: CLINIC | Age: 34
End: 2024-12-03
Payer: COMMERCIAL

## 2024-12-03 DIAGNOSIS — Z79.899 ON PRE-EXPOSURE PROPHYLAXIS FOR HIV: Primary | ICD-10-CM

## 2024-12-03 NOTE — TELEPHONE ENCOUNTER
Pt is complaining of pain in upper thigh and lower back. States that his mom has sciatic nerve pain and he's thinking that this is the same thing. I informed him that he would most likely need to have an appointment made. Also informed him of his labs.

## 2024-12-03 NOTE — TELEPHONE ENCOUNTER
----- Message from Sabine Johnson MD sent at 12/3/2024 12:35 PM CST -----  Regarding: RE: Truvada PrEP  Additional orders placed  ----- Message -----  From: Yessica Galindo MA  Sent: 11/27/2024   3:34 PM CST  To: Sabine Johnson MD  Subject: FW: Truvada PrEP                                 Please read and advise  ----- Message -----  From: Magda Restrepo, Lachelle  Sent: 11/27/2024   3:28 PM CST  To: [facility] Staff  Subject: Truvada PrEP                                     Hi!    [facility] received patient's prescription for Truvada. It appears patient needs testing for HBV. Prior to PrEP initiation it is recommend that patient's receive testing for HBV infection, which should include HBsAg, hepatitis B core antibody (anti-HBc), and hepatitis B surface antibody (anti-HBs). Could you please schedule this for patient?    Thanks,  Magda Restrepo, PharmD  Clinical Pharmacist  [facility] [place] [address] [address], [facility] [[email or web address] [address]  [phone]

## 2024-12-05 ENCOUNTER — OFFICE VISIT (OUTPATIENT)
Dept: INTERNAL MEDICINE | Facility: CLINIC | Age: 34
End: 2024-12-05
Payer: COMMERCIAL

## 2024-12-05 VITALS
TEMPERATURE: 98 F | RESPIRATION RATE: 20 BRPM | HEART RATE: 78 BPM | SYSTOLIC BLOOD PRESSURE: 110 MMHG | OXYGEN SATURATION: 98 % | WEIGHT: 159.06 LBS | HEIGHT: 73 IN | DIASTOLIC BLOOD PRESSURE: 68 MMHG | BODY MASS INDEX: 21.08 KG/M2

## 2024-12-05 DIAGNOSIS — M54.50 LOW BACK PAIN RADIATING TO LEFT LOWER EXTREMITY: Primary | ICD-10-CM

## 2024-12-05 DIAGNOSIS — M79.605 LOW BACK PAIN RADIATING TO LEFT LOWER EXTREMITY: Primary | ICD-10-CM

## 2024-12-05 PROCEDURE — 99999 PR PBB SHADOW E&M-EST. PATIENT-LVL IV: CPT | Mod: PBBFAC,,, | Performed by: FAMILY MEDICINE

## 2024-12-05 RX ORDER — TIZANIDINE 4 MG/1
4 TABLET ORAL EVERY 6 HOURS PRN
Qty: 28 TABLET | Refills: 0 | Status: SHIPPED | OUTPATIENT
Start: 2024-12-05

## 2024-12-05 NOTE — PROGRESS NOTES
Subjective:       Patient ID: Quinn Cardozo Jr. is a 34 y.o. male.    Chief Complaint: sciatic nerve    History of Present Illness    CHIEF COMPLAINT:  Quinn presents with recurring back pain and hip discomfort that has been intermittent over the past 3 months.    HPI:  Quinn reports back pain that initially occurred 3 months ago after an awkward movement. The pain subsided but returned last week after moving furniture, causing sudden onset of severe pain and a fall. Initially localized in the hip, causing limping for the first few days, the pain improved with stretching but then moved back to the original location in the lower back.    The back pain has been intermittent over the past week, with renewed discomfort today. The pain is located in the lower back, specifically in the L5 region and over the hip, with tenderness in these areas.    Quinn mentions that back pain runs in his family, with all family members having similar issues. He acknowledges that his body size, physique, and natural stature may contribute to his susceptibility to these ailments.    Quinn denies shooting pain down the leg, which would indicate sciatic nerve involvement.    FAMILY HISTORY:  Family history is significant for family members with back pain/sciatica.    SOCIAL HISTORY:  Quinn works at a school.        Review of Systems   Constitutional:  Negative for fever.   Cardiovascular:  Negative for chest pain.   Gastrointestinal:  Negative for abdominal pain.   Genitourinary:  Negative for dysuria and hematuria.   Musculoskeletal:  Positive for back pain.   Neurological:  Negative for weakness, numbness and headaches.       Objective:      Physical Exam  Constitutional:       Appearance: Normal appearance.   HENT:      Head: Normocephalic and atraumatic.   Eyes:      General: No scleral icterus.  Cardiovascular:      Rate and Rhythm: Normal rate.   Pulmonary:      Effort: Pulmonary effort is normal. No  "respiratory distress.   Musculoskeletal:         General: Tenderness present.      Comments: L5,S1 region, mild tenderness to overlying area L greater trochanter    Neurological:      General: No focal deficit present.      Mental Status: He is alert.   Psychiatric:         Mood and Affect: Mood normal.         Behavior: Behavior normal.         Assessment/Plan:       1. Low back pain radiating to left lower extremity      Assessment & Plan    IMPRESSION:  - Assessed patient's back pain, noting point tenderness over L5 and L greater trochanter region  - Determined conservative management appropriate at this time, opting for muscle relaxants and lifestyle modifications over injection or imaging  - Considered potential causes including body size, previous trauma, and weight gain as contributing factors to back pain    LOW BACK PAIN:  Chronic issue with exacerbation  - tiZANidine (ZANAFLEX) 4 MG tablet; Take 1 tablet (4 mg total) by mouth every 6 (six) hours as needed (back pain).  Dispense: 28 tablet; Refill: 0  - Emphasized the importance of continued movement for back pain management, emphasizing "motion is lotion" principle.  - Discussed potential causes of back pain, including body size, previous trauma, and weight gain.  - Johndrick to continue regular daily walking and movement.  - Recommend using heating pads for pain relief.  - Keldrick to perform recommended exercises to keep spinal space open and limber.  - Started muscle relaxants for at least 1 week.  - Do not take before work due to drowsiness.  - Provided information on greater trochanter and hip flexor muscle involvement in lateral leg pain.    FOLLOW-UP:  - Contact the office if pain persists or worsens after trying prescribed treatments.  - Follow up sooner than usual if symptoms do not improve.              Future Appointments   Date Time Provider Department Center   5/26/2025  3:00 PM Sabine Johnson MD [facility] Vance Johnson, " MD  [facility] [address]  [place] [address]  [phone]      This note was generated with the assistance of ambient listening technology. Verbal consent was obtained by the patient and accompanying visitor(s) for the recording of patient appointment to facilitate this note. I attest to having reviewed and edited the generated note for accuracy, though some syntax or spelling errors may persist. Please contact the author of this note for any clarification.       Answers submitted by the patient for this visit:  Back Pain Questionnaire (Submitted on 12/5/2024)  Chief Complaint: Back pain  Chronicity: new  Onset: in the past 7 days  Frequency: daily  Progression since onset: waxing and waning  Pain location: lumbar spine  Pain quality: aching  Radiates to: left thigh  Pain - numeric: 7/10  Pain is: worse during the day  Aggravated by: sitting  Stiffness is present: all day  bladder incontinence: No  bowel incontinence: No  leg pain: No  paresis: No  paresthesias: No  pelvic pain: No  perianal numbness: No  tingling: No  weight loss: No  genital pain: No  Pain severity: moderate  Improvement on treatment: mild